# Patient Record
Sex: FEMALE | Race: WHITE | NOT HISPANIC OR LATINO | Employment: FULL TIME | URBAN - METROPOLITAN AREA
[De-identification: names, ages, dates, MRNs, and addresses within clinical notes are randomized per-mention and may not be internally consistent; named-entity substitution may affect disease eponyms.]

---

## 2017-04-06 ENCOUNTER — TRANSCRIBE ORDERS (OUTPATIENT)
Dept: ADMINISTRATIVE | Facility: HOSPITAL | Age: 47
End: 2017-04-06

## 2017-04-06 DIAGNOSIS — Z12.31 ENCOUNTER FOR SCREENING MAMMOGRAM FOR MALIGNANT NEOPLASM OF BREAST: ICD-10-CM

## 2017-04-06 DIAGNOSIS — Z12.31 ENCOUNTER FOR MAMMOGRAM TO ESTABLISH BASELINE MAMMOGRAM: Primary | ICD-10-CM

## 2017-04-12 ENCOUNTER — HOSPITAL ENCOUNTER (OUTPATIENT)
Dept: MAMMOGRAPHY | Facility: CLINIC | Age: 47
Discharge: HOME | End: 2017-04-12
Payer: COMMERCIAL

## 2017-04-12 DIAGNOSIS — Z12.31 ENCOUNTER FOR SCREENING MAMMOGRAM FOR MALIGNANT NEOPLASM OF BREAST: ICD-10-CM

## 2017-04-12 PROCEDURE — G0202 SCR MAMMO BI INCL CAD: HCPCS

## 2017-08-24 ENCOUNTER — GENERIC CONVERSION - ENCOUNTER (OUTPATIENT)
Dept: OTHER | Facility: OTHER | Age: 47
End: 2017-08-24

## 2017-11-01 ENCOUNTER — ALLSCRIPTS OFFICE VISIT (OUTPATIENT)
Dept: OTHER | Facility: OTHER | Age: 47
End: 2017-11-01

## 2017-11-01 ENCOUNTER — LAB REQUISITION (OUTPATIENT)
Dept: LAB | Facility: HOSPITAL | Age: 47
End: 2017-11-01
Payer: COMMERCIAL

## 2017-11-01 DIAGNOSIS — N92.6 IRREGULAR MENSTRUATION: ICD-10-CM

## 2017-11-01 DIAGNOSIS — Z01.419 ENCOUNTER FOR GYNECOLOGICAL EXAMINATION WITHOUT ABNORMAL FINDING: ICD-10-CM

## 2017-11-01 DIAGNOSIS — E78.5 HYPERLIPIDEMIA: ICD-10-CM

## 2017-11-01 DIAGNOSIS — R53.83 OTHER FATIGUE: ICD-10-CM

## 2017-11-01 PROCEDURE — 87624 HPV HI-RISK TYP POOLED RSLT: CPT | Performed by: FAMILY MEDICINE

## 2017-11-01 PROCEDURE — G0145 SCR C/V CYTO,THINLAYER,RESCR: HCPCS | Performed by: FAMILY MEDICINE

## 2017-11-06 LAB — HPV RRNA GENITAL QL NAA+PROBE: NORMAL

## 2017-11-08 LAB
LAB AP GYN PRIMARY INTERPRETATION: NORMAL
Lab: NORMAL

## 2018-01-16 ENCOUNTER — ALLSCRIPTS OFFICE VISIT (OUTPATIENT)
Dept: OTHER | Facility: OTHER | Age: 48
End: 2018-01-16

## 2018-01-16 DIAGNOSIS — K59.00 CONSTIPATION: ICD-10-CM

## 2018-01-16 DIAGNOSIS — R19.4 CHANGE IN BOWEL HABITS: ICD-10-CM

## 2018-01-17 NOTE — PROGRESS NOTES
Assessment   1  Never a smoker  2  Encounter for routine gynecological examination (V72 31) (Z01 419)  3  Menstrual periods irregular (626 4) (N92 6)  4  Change in bowel movement (787 99) (R19 4)    Plan  Allergic rhinitis    · Renew: Fluticasone Propionate 50 MCG/ACT Nasal Suspension; USE 2 SPRAYS IN  EACH NOSTRIL DAILY  Change in bowel movement    · 1 - Malia Wilson DO (Gastroenterology) Co-Management  *  Status: Active   Requested for: 75OGW8353  () Care Summary provided  : Yes  Encounter for routine gynecological examination    · Follow-up visit in 1 year Evaluation and Treatment  Follow-up  Status: Hold For -  Scheduling  Requested for: 12FSI0627   · (1) THIN PREP PAP WITH IMAGING; Status: In Progress - Specimen/Data Collected;    Done: 79AKZ3095  PAP Maturation index required? : No  Reflex HPV? : Regardless of Interpretation  Encounter for screening mammogram for malignant neoplasm of breast    · * MAMMO SCREENING BILATERAL W CAD; Status:Active; Requested for:13Apr2018; Fatigue, Menstrual periods irregular    · (1) CBC/PLT/DIFF; Status:Active; Requested for:01Nov2017;    · (1) COMPREHENSIVE METABOLIC PANEL; Status:Active; Requested for:01Nov2017;    · (1) TSH WITH FT4 REFLEX; Status:Active; Requested for:01Nov2017;   Hyperlipidemia    · (1) LIPID PANEL FASTING W DIRECT LDL REFLEX; Status:Active; Requested  for:01Nov2017;     Discussion/Summary  health maintenance visit Currently, she eats a poor diet and has an inadequate exercise regimen  Pap test was done today Breast cancer screening: mammogram is current and the next mammogram is due 04/2018  Colorectal cancer screening: the risks and benefits of colorectal cancer screening were discussed and GI referral  The immunizations are up to date  Advice and education were given regarding nutrition, aerobic exercise, weight bearing exercise, vitamin D supplements, sunscreen use and self skin examination  Patient discussion: discussed with the patient  The treatment plan was reviewed with the patient/guardian  The patient/guardian understands and agrees with the treatment plan      Chief Complaint  Pt presents in the office today for a pap;    Mammo due 04/12/2018  Pap due 09/23/2016      History of Present Illness  HM, Adult Female: The patient is being seen for a gynecology evaluation  The last health maintenance visit was 2 year(s) ago  General Health: She has regular dental visits  She denies vision problems  She denies hearing loss  Immunizations status: up to date  Lifestyle:  She does not have a healthy diet  She does not exercise regularly  She does not use tobacco  She consumes alcohol  She reports occasional alcohol use  She denies drug use  Reproductive health: the patient is premenopausal   she uses contraception  For contraception, she uses oral contraception pills  pregnancy history: G 2P 2   has been having spotting mid cycle for 1-2 days for the past 2 cycles  Screening: cancer screening reviewed and current  Review of Systems    Constitutional: No fever, no chills, feels well, no tiredness, no recent weight gain or weight loss  ENT: no complaints of earache, no loss of hearing, no nose bleeds, no nasal discharge, no sore throat, no hoarseness  Cardiovascular: the heart rate was not slow, no chest pain, no intermittent leg claudication, the heart rate was not fast, no palpitations and no lower extremity edema  Respiratory: no shortness of breath, no orthopnea, no wheezing, no shortness of breath during exertion and no PND  Gastrointestinal: loose stools alternating with constipation, but no abdominal pain, no nausea, no vomiting and no blood in stools  Genitourinary: as noted in HPI, no dysuria, no pelvic pain, no vaginal discharge and no dysmenorrhea  Musculoskeletal: No complaints of arthralgias, no myalgias, no joint swelling or stiffness, no limb pain or swelling  Integumentary: no breast pain and no breast lump  Neurological: no headache, no numbness, no tingling, no dizziness, no limb weakness, no fainting and no difficulty walking  Psychiatric: no anxiety, no sleep disturbances, no depression and no emotional problems  Hematologic/Lymphatic: no swollen glands  Active Problems   1  Allergic rhinitis (477 9) (J30 9)  2  Anxiety (300 00) (F41 9)  3  Change in bowel movement (787 99) (R19 4)  4  Contraceptives (V25 02)  5  Encounter for routine gynecological examination (V72 31) (Z01 419)  6  Encounter for screening mammogram for malignant neoplasm of breast (V76 12)   (Z12 31)  7  Fatigue (780 79) (R53 83)  8  Hyperlipidemia (272 4) (E78 5)  9  Menstrual periods irregular (626 4) (N92 6)  10  Need for prophylactic vaccination and inoculation against influenza (V04 81) (Z23)  11  Vitamin D deficiency (268 9) (E55 9)    Past Medical History    · Acute sinusitis (461 9) (J01 90)   · Bronchitis, acute (466 0) (J20 9)   · History of Cough (786 2) (R05)   · History of Encounter for routine gynecological examination (V72 31) (Z01 419)   · Need for prophylactic vaccination and inoculation against influenza (V04 81) (Z23)    Surgical History    · History of  Section   · Contraceptives (V25 02)    Family History  Father    · Family history of Coronary Artery Disease (V17 49)   · Denied: Family history of substance abuse1    · No family history of mental disorder1   Maternal Grandmother    · Family history of Diabetes Mellitus (V18 0)   · Family history of Maternal Grandmother Is   Paternal Grandmother    · Family history of Colon Cancer (V16 0)  Maternal Aunt    · Family history of Breast Cancer (V16 3)     1 Amended By: Sonam Clifford; 2017 11:12 PM EST    Social History    · Being A Social Drinker   · Caffeine Use   · 1-1 1/2 cup coffee per day   · Never a smoker   · No drug use    Current Meds  1  Cetirizine HCl - 10 MG Oral Tablet; TAKE 1 TABLET DAILY AS NEEDED; Last   Rx:36Rpi6848 Ordered  2  Fluticasone Propionate 50 MCG/ACT Nasal Suspension; USE 2 SPRAYS IN EACH   NOSTRIL DAILY; Therapy: 00SLE1569 to (Last Rx:90Lxv1664)  Requested for: 22Tow0421 Ordered  3  Low-Ogestrel 0 3-30 MG-MCG Oral Tablet; Take 1 tablet daily; Therapy: 91CVM8179 to (Last Rx:73Uey9393)  Requested for: 49BGP3115 Ordered  4  Proventil  (90 Base) MCG/ACT Inhalation Aerosol Solution; INHALE 2 PUFFS   EVERY 4-6 HOURS AS NEEDED; Therapy: 53RLT2713 to (Last Rx:10Oct2014) Ordered    Allergies   1  No Known Drug Allergies    Vitals   Recorded: 91EGI3785 01:44PM   Heart Rate 76   Respiration 16   Systolic 639   Diastolic 82   Height 5 ft 4 in   Weight 214 lb 6 4 oz   BMI Calculated 36 8   BSA Calculated 2 01     Physical Exam    Constitutional   General appearance: No acute distress, well appearing and well nourished  Ears, Nose, Mouth, and Throat   Otoscopic examination: Tympanic membranes translucent with normal light reflex  Canals patent without erythema  Lips, teeth, and gums: Normal, good dentition  Oropharynx: Normal with no erythema, edema, exudate or lesions  Neck   Neck: Supple, symmetric, trachea midline, no masses  Thyroid: Normal, no thyromegaly  Pulmonary   Respiratory effort: No increased work of breathing or signs of respiratory distress  Auscultation of lungs: Clear to auscultation  Cardiovascular   Auscultation of heart: Normal rate and rhythm, normal S1 and S2, no murmurs  Examination of extremities for edema and/or varicosities: Normal     Chest   Breasts: Normal, no dimpling or skin changes appreciated  Palpation of breasts and axillae: Normal, no masses palpated  Abdomen   Abdomen: Non-tender, no masses  Liver and spleen: No hepatomegaly or splenomegaly  Genitourinary   External genitalia and vagina: Normal, no lesions appreciated  Cervix: Normal, no lesions, Pap obtained  Uterus: Normal size, no tenderness, no masses      Adnexa/Parametria: Normal, no masses or tenderness  Lymphatic   Palpation of lymph nodes in neck: No lymphadenopathy  Palpation of lymph nodes in axillae: No lymphadenopathy  Palpation of lymph nodes in groin: No lymphadenopathy  Psychiatric   Orientation to person, place, and time: Normal     Recent and remote memory: Intact  Mood and affect: Normal        Results/Data  PHQ-2 Adult Depression Screening 30OBB2244 01:47PM User, Willy     Test Name Result Flag Reference   PHQ-2 Adult Depression Score 0     Over the last two weeks, how often have you been bothered by any of the following problems? Little interest or pleasure in doing things: Not at all - 0  Feeling down, depressed, or hopeless: Not at all - 0   PHQ-2 Adult Depression Screening Negative         Health Management  Encounter for routine gynecological examination   (every) THINPREP TIS PAP RFX HPV; every 2 years; Last 93IXS0491; Next Due:  24VLN6975; Overdue  Encounter for screening mammogram for malignant neoplasm of breast   * MAMMO SCREENING BILATERAL W CAD; every 1 year; Last 56Psg4874; Next Due:  21Rnc6148;  Active    Signatures   Electronically signed by : DINA Ge ; Nov 1 2017 11:12PM EST                       (Author)

## 2018-01-17 NOTE — CONSULTS
Assessment   1  Constipation (564 00) (K59 00)   2  Change in bowel movement (787 99) (R19 4)   3  Irritable bowel syndrome with both constipation and diarrhea (564 1) (K58 2)    Plan   Change in bowel movement    · MoviPrep 100 GM Oral Solution Reconstituted; USE AS DIRECTED   Rx By: Angella Richard; Dispense: 1 Days ; #:1 Solution Reconstituted; Refill: 0;For: Change in bowel movement; SHAYLA = N; Verified Transmission to [id number] Last Updated By: System, [employer]; 1/16/2018 10:06:04 AM   · (1) TISSUE TRANSGLUTAMINASE IGA; Status:Active; Requested VDB:92YIJ3338; Perform:[facility]; FZA:46BGC8558;OBWIACP; For:Change in bowel movement; Ordered By:Cassie Wilson;   · COLONOSCOPY (GI, SURG); Status:Hold For - Scheduling; Requested VIS:83SXC8651; Perform:[facility]; OIN:59YDC3463;TELOSVG; For:Change in bowel movement; Ordered By:Cassie Wilson; Change in bowel movement, Constipation    · (1) IGA; Status:Active; Requested MDH:22IWD2570; Perform:[facility]; ENRIKE:25YZD2432;PPWPBAV; For:Change in bowel movement, Constipation; Ordered By:Charlotte Wilson;  Irritable bowel syndrome with both constipation and diarrhea    · Follow-up visit in 4 Months Evaluation and Treatment  Follow-up  Status: Complete     Done: 98ZOZ0063   Ordered; For: Irritable bowel syndrome with both constipation and diarrhea; Ordered By: Angella Richard Performed:  Due: 90JUD1900; Last Updated By: Shayy Rodriguez; 1/16/2018 10:27:43 AM    Discussion/Summary   Discussion Summary:    41year old female with    family history of colon cancer- will do colonoscopy IBS- mixed predominant- will rule out celiac disease, labs to assess for anemia and TSH as well as CMP   up in a few months time      Chief Complaint   Chief Complaint Free Text Note Form: Consult for change in bowel habits  Pt c/o abdominal pain, constipation, and diarrhea  Referred by Dr Ng Situ        History of Present Illness   HPI: 41year old female referred by PCP  a maternal grandmother had colon cancer in her 52's  Denies any first degree relative with colon cancer  The pt has never had a colonoscopy  denies hematochezia   is an  and thinks she has IBS  She has alternating constipation and diarrhea  She doesn't really have abdominal pain  Weight is stable  Review of Systems   Complete-Female GI Adult:      Constitutional: No fever, no chills, feels well, no tiredness, no recent weight gain or weight loss  Eyes: No complaints of eye pain, no red eyes, no eyesight problems, no discharge, no dry eyes, no itching of eyes  ENT: no complaints of earache, no loss of hearing, no nose bleeds, no nasal discharge, no sore throat, no hoarseness  Cardiovascular: No complaints of slow heart rate, no fast heart rate, no chest pain, no palpitations, no leg claudication, no lower extremity edema  Respiratory: No complaints of shortness of breath, no wheezing, no cough, no SOB on exertion, no orthopnea, no PND  Gastrointestinal: as noted in HPI  Genitourinary: No complaints of dysuria, no incontinence, no pelvic pain, no dysmenorrhea, no vaginal discharge or bleeding  Musculoskeletal: No complaints of arthralgias, no myalgias, no joint swelling or stiffness, no limb pain or swelling  Integumentary: No complaints of skin rash or lesions, no itching, no skin wounds, no breast pain or lump  Neurological: No complaints of headache, no confusion, no convulsions, no numbness, no dizziness or fainting, no tingling, no limb weakness, no difficulty walking  Psychiatric: Not suicidal, no sleep disturbance, no anxiety or depression, no change in personality, no emotional problems  Endocrine: No complaints of proptosis, no hot flashes, no muscle weakness, no deepening of the voice, no feelings of weakness        Hematologic/Lymphatic: No complaints of swollen glands, no swollen glands in the neck, does not bleed easily, does not bruise easily  ROS Reviewed:    ROS reviewed  Active Problems   1  Allergic rhinitis (477 9) (J30 9)   2  Anxiety (300 00) (F41 9)   3  Change in bowel movement (787 99) (R19 4)   4  Contraceptives (V25 02)   5  Encounter for routine gynecological examination (V72 31) (Z01 419)   6  Encounter for screening mammogram for malignant neoplasm of breast (V76 12)     (Z12 31)   7  Fatigue (780 79) (R53 83)   8  Hyperlipidemia (272 4) (E78 5)   9  Menstrual periods irregular (626 4) (N92 6)   10  Need for prophylactic vaccination and inoculation against influenza (V04 81) (Z23)   11  Vitamin D deficiency (268 9) (E55 9)    Past Medical History   1  Acute sinusitis (461 9) (J01 90)   2  Bronchitis, acute (466 0) (J20 9)   3  History of Cough (786 2) (R05)   4  History of Encounter for routine gynecological examination (V72 31) (Z01 419)   5  Need for prophylactic vaccination and inoculation against influenza (V04 81) (Z23)  Active Problems And Past Medical History Reviewed: The active problems and past medical history were reviewed and updated today  Surgical History   1  History of  Section   2  Contraceptives (V25 02)  Surgical History Reviewed: The surgical history was reviewed and updated today  Family History   Mother    1  Denied: Family history of substance abuse   2  No family history of mental disorder  Father    3  Family history of Coronary Artery Disease (V17 49)   4  Denied: Family history of substance abuse   5  No family history of mental disorder  Maternal Grandmother    6  Family history of Diabetes Mellitus (V18 0)   7  Family history of Maternal Grandmother Is   Paternal Grandmother    6  Family history of Colon Cancer (V16 0)  Maternal Aunt    9  Family history of Breast Cancer (V16 3)  Family History Reviewed: The family history was reviewed and updated today         Social History    · Being A Social Drinker   · Caffeine Use   · Never a smoker   · No drug use  Social History Reviewed: The social history was reviewed and updated today  Current Meds    1  Cetirizine HCl - 10 MG Oral Tablet; TAKE 1 TABLET DAILY AS NEEDED; Last     Rx:09Lhv4772 Ordered   2  Fluticasone Propionate 50 MCG/ACT Nasal Suspension; USE 2 SPRAYS IN EACH     NOSTRIL DAILY; Therapy: 13CFH9249 to (Last Rx:01Nov2017)  Requested for: 07OAJ1306 Ordered   3  Low-Ogestrel 0 3-30 MG-MCG Oral Tablet; Take 1 tablet daily; Therapy: 02HRQ0648 to (Last Rx:60Aqq8566)  Requested for: 93OWZ6497 Ordered   4  Proventil  (90 Base) MCG/ACT Inhalation Aerosol Solution; INHALE 2 PUFFS     EVERY 4-6 HOURS AS NEEDED; Therapy: 06IIY2699 to (Last Rx:10Oct2014) Ordered  Medication List Reviewed: The medication list was reviewed and updated today  Allergies   1  No Known Drug Allergies  2  No Known Food Allergies   3  Seasonal    Vitals   Vital Signs    Recorded: 44CWC7768 09:54AM   Temperature 97 8 F, Tympanic   Heart Rate 73   Systolic 693, LUE, Sitting   Diastolic 84, LUE, Sitting   Height 5 ft 4 in   Weight 217 lb    BMI Calculated 37 25   BSA Calculated 2 03   O2 Saturation 98, RA     Physical Exam        Constitutional      General appearance: No acute distress, well appearing and well nourished  Eyes      Conjunctiva and lids: No swelling, erythema or discharge  Pupils and irises: Equal, round and reactive to light  Ears, Nose, Mouth, and Throat      External inspection of ears and nose: Normal        Nasal mucosa, septum, and turbinates: Normal without edema or erythema  Oropharynx: Normal with no erythema, edema, exudate or lesions  Pulmonary      Respiratory effort: No increased work of breathing or signs of respiratory distress  Auscultation of lungs: Clear to auscultation  Cardiovascular      Auscultation of heart: Normal rate and rhythm, normal S1 and S2, without murmurs         Examination of extremities for edema and/or varicosities: Normal        Abdomen      Abdomen: Non-tender, no masses  Liver and spleen: No hepatomegaly or splenomegaly  Lymphatic      Palpation of lymph nodes in neck: No lymphadenopathy  Musculoskeletal      Gait and station: Normal        Digits and nails: Normal without clubbing or cyanosis  Inspection/palpation of joints, bones, and muscles: Normal        Skin      Skin and subcutaneous tissue: Normal without rashes or lesions         Psychiatric      Orientation to person, place, and time: Normal        Mood and affect: Normal           Signatures    Electronically signed by : Maria Esther Mcbride DO; Jan 16 2018 10:38AM EST                       (Author)

## 2018-01-22 VITALS
HEART RATE: 76 BPM | DIASTOLIC BLOOD PRESSURE: 82 MMHG | WEIGHT: 214.4 LBS | HEIGHT: 64 IN | RESPIRATION RATE: 16 BRPM | BODY MASS INDEX: 36.6 KG/M2 | SYSTOLIC BLOOD PRESSURE: 138 MMHG

## 2018-01-22 VITALS — BODY MASS INDEX: 32.61 KG/M2 | WEIGHT: 191 LBS | HEIGHT: 64 IN

## 2018-01-23 VITALS
HEART RATE: 73 BPM | DIASTOLIC BLOOD PRESSURE: 84 MMHG | HEIGHT: 64 IN | SYSTOLIC BLOOD PRESSURE: 122 MMHG | OXYGEN SATURATION: 98 % | WEIGHT: 217 LBS | TEMPERATURE: 97.8 F | BODY MASS INDEX: 37.05 KG/M2

## 2018-01-29 PROBLEM — R19.8 CHANGE IN BOWEL MOVEMENT: Status: ACTIVE | Noted: 2018-01-29

## 2018-02-16 ENCOUNTER — APPOINTMENT (OUTPATIENT)
Dept: LAB | Facility: CLINIC | Age: 48
End: 2018-02-16
Payer: COMMERCIAL

## 2018-02-16 DIAGNOSIS — K59.00 CONSTIPATION: ICD-10-CM

## 2018-02-16 DIAGNOSIS — E78.5 HYPERLIPIDEMIA: ICD-10-CM

## 2018-02-16 DIAGNOSIS — N92.6 IRREGULAR MENSTRUATION: ICD-10-CM

## 2018-02-16 DIAGNOSIS — R19.4 CHANGE IN BOWEL HABITS: ICD-10-CM

## 2018-02-16 DIAGNOSIS — R53.83 OTHER FATIGUE: ICD-10-CM

## 2018-02-16 LAB
ALBUMIN SERPL BCP-MCNC: 3.6 G/DL (ref 3.5–5)
ALP SERPL-CCNC: 45 U/L (ref 46–116)
ALT SERPL W P-5'-P-CCNC: 15 U/L (ref 12–78)
ANION GAP SERPL CALCULATED.3IONS-SCNC: 7 MMOL/L (ref 4–13)
AST SERPL W P-5'-P-CCNC: 10 U/L (ref 5–45)
BASOPHILS # BLD AUTO: 0.02 THOUSANDS/ΜL (ref 0–0.1)
BASOPHILS NFR BLD AUTO: 0 % (ref 0–1)
BILIRUB SERPL-MCNC: 0.43 MG/DL (ref 0.2–1)
BUN SERPL-MCNC: 14 MG/DL (ref 5–25)
CALCIUM SERPL-MCNC: 8.6 MG/DL (ref 8.3–10.1)
CHLORIDE SERPL-SCNC: 104 MMOL/L (ref 100–108)
CHOLEST SERPL-MCNC: 207 MG/DL (ref 50–200)
CO2 SERPL-SCNC: 28 MMOL/L (ref 21–32)
CREAT SERPL-MCNC: 0.56 MG/DL (ref 0.6–1.3)
EOSINOPHIL # BLD AUTO: 0.17 THOUSAND/ΜL (ref 0–0.61)
EOSINOPHIL NFR BLD AUTO: 2 % (ref 0–6)
ERYTHROCYTE [DISTWIDTH] IN BLOOD BY AUTOMATED COUNT: 13.3 % (ref 11.6–15.1)
GFR SERPL CREATININE-BSD FRML MDRD: 113 ML/MIN/1.73SQ M
GLUCOSE P FAST SERPL-MCNC: 82 MG/DL (ref 65–99)
HCT VFR BLD AUTO: 39.2 % (ref 34.8–46.1)
HDLC SERPL-MCNC: 71 MG/DL (ref 40–60)
HGB BLD-MCNC: 13.3 G/DL (ref 11.5–15.4)
IGA SERPL-MCNC: 156 MG/DL (ref 70–400)
LDLC SERPL CALC-MCNC: 105 MG/DL (ref 0–100)
LYMPHOCYTES # BLD AUTO: 2.59 THOUSANDS/ΜL (ref 0.6–4.47)
LYMPHOCYTES NFR BLD AUTO: 37 % (ref 14–44)
MCH RBC QN AUTO: 29 PG (ref 26.8–34.3)
MCHC RBC AUTO-ENTMCNC: 33.9 G/DL (ref 31.4–37.4)
MCV RBC AUTO: 86 FL (ref 82–98)
MONOCYTES # BLD AUTO: 0.42 THOUSAND/ΜL (ref 0.17–1.22)
MONOCYTES NFR BLD AUTO: 6 % (ref 4–12)
NEUTROPHILS # BLD AUTO: 3.88 THOUSANDS/ΜL (ref 1.85–7.62)
NEUTS SEG NFR BLD AUTO: 55 % (ref 43–75)
NRBC BLD AUTO-RTO: 0 /100 WBCS
PLATELET # BLD AUTO: 244 THOUSANDS/UL (ref 149–390)
PMV BLD AUTO: 11.4 FL (ref 8.9–12.7)
POTASSIUM SERPL-SCNC: 4.1 MMOL/L (ref 3.5–5.3)
PROT SERPL-MCNC: 7.3 G/DL (ref 6.4–8.2)
RBC # BLD AUTO: 4.58 MILLION/UL (ref 3.81–5.12)
SODIUM SERPL-SCNC: 139 MMOL/L (ref 136–145)
TRIGL SERPL-MCNC: 156 MG/DL
TSH SERPL DL<=0.05 MIU/L-ACNC: 1.48 UIU/ML (ref 0.36–3.74)
WBC # BLD AUTO: 7.1 THOUSAND/UL (ref 4.31–10.16)

## 2018-02-16 PROCEDURE — 80053 COMPREHEN METABOLIC PANEL: CPT

## 2018-02-16 PROCEDURE — 85025 COMPLETE CBC W/AUTO DIFF WBC: CPT

## 2018-02-16 PROCEDURE — 80061 LIPID PANEL: CPT

## 2018-02-16 PROCEDURE — 82784 ASSAY IGA/IGD/IGG/IGM EACH: CPT

## 2018-02-16 PROCEDURE — 84443 ASSAY THYROID STIM HORMONE: CPT

## 2018-02-16 PROCEDURE — 36415 COLL VENOUS BLD VENIPUNCTURE: CPT

## 2018-02-16 PROCEDURE — 83516 IMMUNOASSAY NONANTIBODY: CPT

## 2018-02-17 LAB — TTG IGA SER-ACNC: <2 U/ML (ref 0–3)

## 2018-02-19 ENCOUNTER — ANESTHESIA (OUTPATIENT)
Dept: GASTROENTEROLOGY | Facility: MEDICAL CENTER | Age: 48
End: 2018-02-19
Payer: COMMERCIAL

## 2018-02-19 ENCOUNTER — ANESTHESIA EVENT (OUTPATIENT)
Dept: GASTROENTEROLOGY | Facility: MEDICAL CENTER | Age: 48
End: 2018-02-19
Payer: COMMERCIAL

## 2018-02-19 ENCOUNTER — HOSPITAL ENCOUNTER (OUTPATIENT)
Facility: MEDICAL CENTER | Age: 48
Setting detail: OUTPATIENT SURGERY
Discharge: HOME | End: 2018-02-19
Attending: INTERNAL MEDICINE | Admitting: INTERNAL MEDICINE
Payer: COMMERCIAL

## 2018-02-19 VITALS
SYSTOLIC BLOOD PRESSURE: 150 MMHG | TEMPERATURE: 98.4 F | WEIGHT: 210 LBS | OXYGEN SATURATION: 98 % | DIASTOLIC BLOOD PRESSURE: 81 MMHG | RESPIRATION RATE: 18 BRPM | BODY MASS INDEX: 35.85 KG/M2 | HEIGHT: 64 IN | HEART RATE: 79 BPM

## 2018-02-19 DIAGNOSIS — R19.8 CHANGE IN BOWEL MOVEMENT: ICD-10-CM

## 2018-02-19 LAB — EXT PREGNANCY TEST URINE: NEGATIVE

## 2018-02-19 PROCEDURE — 45380 COLONOSCOPY AND BIOPSY: CPT | Performed by: INTERNAL MEDICINE

## 2018-02-19 PROCEDURE — 81025 URINE PREGNANCY TEST: CPT | Performed by: ANESTHESIOLOGY

## 2018-02-19 PROCEDURE — 88305 TISSUE EXAM BY PATHOLOGIST: CPT | Performed by: INTERNAL MEDICINE

## 2018-02-19 PROCEDURE — 88305 TISSUE EXAM BY PATHOLOGIST: CPT | Performed by: PATHOLOGY

## 2018-02-19 RX ORDER — SODIUM CHLORIDE 9 MG/ML
125 INJECTION, SOLUTION INTRAVENOUS CONTINUOUS
Status: DISCONTINUED | OUTPATIENT
Start: 2018-02-19 | End: 2018-02-19 | Stop reason: HOSPADM

## 2018-02-19 RX ORDER — CETIRIZINE HYDROCHLORIDE 10 MG/1
10 TABLET ORAL DAILY
COMMUNITY

## 2018-02-19 RX ORDER — ALBUTEROL SULFATE 90 UG/1
2 AEROSOL, METERED RESPIRATORY (INHALATION) EVERY 6 HOURS PRN
COMMUNITY
End: 2021-04-02 | Stop reason: ALTCHOICE

## 2018-02-19 RX ORDER — PROPOFOL 10 MG/ML
INJECTION, EMULSION INTRAVENOUS AS NEEDED
Status: DISCONTINUED | OUTPATIENT
Start: 2018-02-19 | End: 2018-02-19 | Stop reason: SURG

## 2018-02-19 RX ADMIN — PROPOFOL 50 MG: 10 INJECTION, EMULSION INTRAVENOUS at 13:14

## 2018-02-19 RX ADMIN — PROPOFOL 50 MG: 10 INJECTION, EMULSION INTRAVENOUS at 13:12

## 2018-02-19 RX ADMIN — SODIUM CHLORIDE 125 ML/HR: 0.9 INJECTION, SOLUTION INTRAVENOUS at 13:03

## 2018-02-19 RX ADMIN — PROPOFOL 50 MG: 10 INJECTION, EMULSION INTRAVENOUS at 13:20

## 2018-02-19 RX ADMIN — PROPOFOL 50 MG: 10 INJECTION, EMULSION INTRAVENOUS at 13:17

## 2018-02-19 RX ADMIN — PROPOFOL 50 MG: 10 INJECTION, EMULSION INTRAVENOUS at 13:13

## 2018-02-19 RX ADMIN — PROPOFOL 50 MG: 10 INJECTION, EMULSION INTRAVENOUS at 13:25

## 2018-02-19 NOTE — OP NOTE
**** GI/ENDOSCOPY REPORT ****     PATIENT NAME: Kelly Chang ------ VISIT ID:  Patient ID:   [id number] YOB: 1970     INTRODUCTION: Colonoscopy - A 41 female patient presents for an outpatient   Colonoscopy at [address] [place] [address]  PREVIOUS COLONOSCOPY: No prior colonoscopy  INDICATIONS: Abdominal pain  Change in bowel habits  CONSENT:  The benefits, risks, and alternatives to the procedure were   discussed and informed consent was obtained from the patient  PREPARATION: EKG, pulse, pulse oximetry and blood pressure were monitored   throughout the procedure  The patient was identified by myself both   verbally and by visual inspection of ID band  Airway Assessment   Classification: Airway class 2 - Visualization of the soft palate, fauces   and uvula  ASA Classification: See anesthesia record  MEDICATIONS: Anesthesia-check records     PROCEDURE:  The endoscope was passed without difficulty through the anus   under direct visualization and advanced to the cecum, confirmed by   appendiceal orifice and ileocecal valve  The scope was withdrawn and the   mucosa was carefully examined  The quality of the preparation was good  Cecal Intubation Time: 6 minutes(s) Scope Withdrawal Time: 11 minutes(s)     RECTAL EXAM: Normal rectal exam      FINDINGS:  The colon appeared to be normal except for small internal   hemorrhoids  A cold forceps biopsy was taken  COMPLICATIONS: There were no complications  IMPRESSIONS: Normal colon  Biopsy taken  RECOMMENDATIONS: Await biopsy results  ESTIMATED BLOOD LOSS:     PATHOLOGY SPECIMENS: Cold forceps random biopsy taken       PROCEDURE CODES:     ICD-9 Codes: 789 00 Abdominal pain, unspecified site 787 99 Other symptoms   involving digestive system     ICD-10 Codes: R10 Abdominal and pelvic pain R19 4 Change in bowel habit     PERFORMED BY: JANETTE Taylor  on 02/19/2018  Version 1, electronically signed by JANETTE Meeks  on   02/19/2018 at 13:38

## 2018-02-19 NOTE — H&P
History and Physical - [facility]  Jamal Cortés 41 y o  female MRN: [id number]                  HPI: Jamal Cortés is a 41y o  year old female who presents for abdominal pain  REVIEW OF SYSTEMS: Per the HPI, and otherwise unremarkable  Historical Information   History reviewed  No pertinent past medical history  Past Surgical History:   Procedure Laterality Date     SECTION      x 2     Social History   History   Alcohol Use    Yes     Comment: occassionally     History   Drug Use No     History   Smoking Status    Never Smoker   Smokeless Tobacco    Never Used     History reviewed  No pertinent family history  Meds/Allergies     Prescriptions Prior to Admission   Medication    cetirizine (ZYRTEC ALLERGY) 10 mg tablet    norgestrel-ethinyl estradiol (LO/OVRAL) 0 3 mg-30 mcg per tablet    albuterol (PROVENTIL HFA,VENTOLIN HFA) 90 mcg/act inhaler       No Known Allergies    Objective     Blood pressure 142/83, pulse 84, temperature 98 4 °F (36 9 °C), temperature source Temporal, resp  rate 16, height 5' 4" (1 626 m), weight 95 3 kg (210 lb), last menstrual period 2018, SpO2 98 %  PHYSICAL EXAM    Gen: NAD  CV: RRR  CHEST: Clear  ABD: soft, NT/ND  EXT: no edema      ASSESSMENT/PLAN:  This is a 41y o  year old female here for abdominal pain      PLAN: colonoscopy

## 2018-02-19 NOTE — ANESTHESIA PREPROCEDURE EVALUATION
Review of Systems/Medical History  Patient summary reviewed        Cardiovascular  Negative cardio ROS    Pulmonary  Negative pulmonary ROS        GI/Hepatic  Negative GI/hepatic ROS          Negative  ROS        Endo/Other  Negative endo/other ROS   Obesity    GYN  Negative gynecology ROS          Hematology  Negative hematology ROS      Musculoskeletal  Negative musculoskeletal ROS        Neurology  Negative neurology ROS      Psychology   Negative psychology ROS              Physical Exam    Airway    Mallampati score: II  TM Distance: >3 FB  Neck ROM: full     Dental   No notable dental hx     Cardiovascular  Comment: Negative ROS, Rhythm: regular, Rate: normal, Cardiovascular exam normal    Pulmonary  Pulmonary exam normal Breath sounds clear to auscultation,     Other Findings        Anesthesia Plan  ASA Score- 2     Anesthesia Type- IV sedation with anesthesia with ASA Monitors  Additional Monitors:   Airway Plan:         Plan Factors-    Induction- intravenous  Postoperative Plan-     Informed Consent- Anesthetic plan and risks discussed with patient

## 2018-02-19 NOTE — DISCHARGE INSTRUCTIONS
Colonoscopy   WHAT YOU NEED TO KNOW:   A colonoscopy is a procedure to examine the inside of your colon (intestine) with a scope  Polyps or tissue growths may have been removed during your colonoscopy  It is normal to feel bloated and to have some abdominal discomfort  You should be passing gas  If you have hemorrhoids or you had polyps removed, you may have a small amount of bleeding  DISCHARGE INSTRUCTIONS:   Seek care immediately if:   · You have a large amount of bright red blood in your bowel movements  · Your abdomen is hard and firm and you have severe pain  · You have sudden trouble breathing  Contact your healthcare provider if:   · You develop a rash or hives  · You have a fever within 24 hours of your procedure  · You have not had a bowel movement for 3 days after your procedure  · You have questions or concerns about your condition or care  Activity:   · Do not lift, strain, or run  for 3 days after your procedure  · Rest after your procedure  You have been given medicine to relax you  Do not  drive or make important decisions until the day after your procedure  Return to your normal activity as directed  · Relieve gas and discomfort from bloating  by lying on your right side with a heating pad on your abdomen  You may need to take short walks to help the gas move out  Eat small meals until bloating is relieved  If you had polyps removed: For 7 days after your procedure:  · Do not  take aspirin  · Do not  go on long car rides  Help prevent constipation:   · Eat a variety of healthy foods  Healthy foods include fruit, vegetables, whole-grain breads, low-fat dairy products, beans, lean meat, and fish  Ask if you need to be on a special diet  Your healthcare provider may recommend that you eat high-fiber foods such as cooked beans  Fiber helps you have regular bowel movements  · Drink liquids as directed    Adults should drink between 9 and 13 eight-ounce cups of liquid every day  Ask what amount is best for you  For most people, good liquids to drink are water, juice, and milk  · Exercise as directed  Talk to your healthcare provider about the best exercise plan for you  Exercise can help prevent constipation, decrease your blood pressure and improve your health  Follow up with your healthcare provider as directed:  Write down your questions so you remember to ask them during your visits  © 2017 [employer] Information is for End User's use only and may not be sold, redistributed or otherwise used for commercial purposes  All illustrations and images included in CareNotes® are the copyrighted property of [employer]  or Shane Urias  The above information is an  only  It is not intended as medical advice for individual conditions or treatments  Talk to your doctor, nurse or pharmacist before following any medical regimen to see if it is safe and effective for you

## 2018-04-13 DIAGNOSIS — Z12.31 ENCOUNTER FOR SCREENING MAMMOGRAM FOR MALIGNANT NEOPLASM OF BREAST: ICD-10-CM

## 2018-04-24 ENCOUNTER — TELEPHONE (OUTPATIENT)
Dept: GASTROENTEROLOGY | Facility: CLINIC | Age: 48
End: 2018-04-24

## 2018-04-24 NOTE — TELEPHONE ENCOUNTER
DR RUST'S PT      Pt called requesting the code change from her procedure done 02/19/18  Pt was xfer to the [facility] billing but they advised her to call the office for this change   [phone]

## 2018-06-06 NOTE — TELEPHONE ENCOUNTER
Spoke to patient about billing issue   informed her she was referred to GI as a diagnostic patient and during her visit she also had other issues that were discussed  Patient would like to see if by adding the screening code, would it help with the cost  I stated I would reach out to billing/coding but I could not guarantee it would make a difference  Reached out to  Calvin Khoury, and he stated she was not referred as a screening colon ( due to family hx) and it would not change the cost by adding it due to her other GI issues  Called and left patient a voicemail for her to call me back  Left my direct extension

## 2018-06-19 DIAGNOSIS — Z30.41 ORAL CONTRACEPTIVE USE: Primary | ICD-10-CM

## 2018-06-19 RX ORDER — NORGESTREL AND ETHINYL ESTRADIOL 0.3-0.03MG
KIT ORAL
Qty: 84 TABLET | Refills: 3 | Status: SHIPPED | OUTPATIENT
Start: 2018-06-19 | End: 2019-05-22 | Stop reason: SDUPTHER

## 2018-07-20 ENCOUNTER — TELEPHONE (OUTPATIENT)
Dept: GASTROENTEROLOGY | Facility: MEDICAL CENTER | Age: 48
End: 2018-07-20

## 2018-07-20 NOTE — TELEPHONE ENCOUNTER
Pt called billing multiple times regarding her procedure, pt is asking that a preventative code be added to the order  I looked into pt note and it does say family history of colon cancer

## 2018-07-20 NOTE — TELEPHONE ENCOUNTER
How would I put in colon cancer screening as an indication now with the new guidelines on a  Colonoscopy that has already been done? Please reach out to pt as I'm not sure if this is what she is asking for? If there is a way to do this, I can given that she is 47 so I could put it in as colon cancer screening

## 2018-08-14 NOTE — TELEPHONE ENCOUNTER
Spoke to patient and informed her since I could not resolve I would forward to my manager  Pt agreed and verbalized understanding

## 2018-10-12 ENCOUNTER — TELEPHONE (OUTPATIENT)
Dept: GASTROENTEROLOGY | Facility: CLINIC | Age: 48
End: 2018-10-12

## 2018-10-12 NOTE — TELEPHONE ENCOUNTER
Called patient to inform her that we would be adjusting off any balance from our end  Patient wanted to assure this also included anesthesiology  Explained to patient I would check to make sure and call her back  Patient agreeable

## 2018-11-21 ENCOUNTER — OFFICE VISIT (OUTPATIENT)
Dept: FAMILY MEDICINE CLINIC | Facility: CLINIC | Age: 48
End: 2018-11-21
Payer: COMMERCIAL

## 2018-11-21 VITALS
HEART RATE: 80 BPM | RESPIRATION RATE: 16 BRPM | HEIGHT: 64 IN | TEMPERATURE: 97.9 F | WEIGHT: 216.3 LBS | SYSTOLIC BLOOD PRESSURE: 120 MMHG | BODY MASS INDEX: 36.93 KG/M2 | DIASTOLIC BLOOD PRESSURE: 78 MMHG

## 2018-11-21 DIAGNOSIS — J06.9 UPPER RESPIRATORY TRACT INFECTION, UNSPECIFIED TYPE: Primary | ICD-10-CM

## 2018-11-21 DIAGNOSIS — R49.0 HOARSENESS: ICD-10-CM

## 2018-11-21 PROCEDURE — 3008F BODY MASS INDEX DOCD: CPT | Performed by: FAMILY MEDICINE

## 2018-11-21 PROCEDURE — 1036F TOBACCO NON-USER: CPT | Performed by: FAMILY MEDICINE

## 2018-11-21 PROCEDURE — 99213 OFFICE O/P EST LOW 20 MIN: CPT | Performed by: FAMILY MEDICINE

## 2018-11-21 RX ORDER — FLUTICASONE PROPIONATE 50 MCG
2 SPRAY, SUSPENSION (ML) NASAL DAILY
COMMUNITY
Start: 2013-03-20 | End: 2020-04-06 | Stop reason: SDUPTHER

## 2018-11-21 RX ORDER — PREDNISONE 20 MG/1
20 TABLET ORAL 2 TIMES DAILY WITH MEALS
Qty: 10 TABLET | Refills: 0 | Status: SHIPPED | OUTPATIENT
Start: 2018-11-21 | End: 2019-12-20 | Stop reason: ALTCHOICE

## 2018-11-21 NOTE — PROGRESS NOTES
Assessment/Plan:  1  Upper respiratory tract infection/ Hoarseness  - discussed plenty of fluids and voice rest, start Prednisone 20 mg bid x 5 days  - predniSONE 20 mg tablet; Take 1 tablet (20 mg total) by mouth 2 (two) times a day with meals  Dispense: 10 tablet; Refill: 0  - follow up in the office if symptoms persist or worsen  Possible side effects of new medications were reviewed with the patient today  The treatment plan was reviewed with the patient  The patient understands and agrees with the treatment plan      Subjective:   Chief Complaint   Patient presents with   Li Louis Like Symptoms      Patient ID: Connie Ac is a 57 y o  female who presents with c/o nasal congestion and cough for 4-5 days, she is now losing her voice and having tightness in her chest  No fever or chills, no sore throat, no trouble swallowing, no heartburn, no purulent mucus production, no pleuritic chest pain, no wheezing  Patient has been taking OTC cold medications  The following portions of the patient's history were reviewed and updated as appropriate: allergies, current medications, past family history, past medical history, past social history, past surgical history and problem list     History reviewed  No pertinent past medical history  Past Surgical History:   Procedure Laterality Date     SECTION      x 2    COLONOSCOPY N/A 2018    Procedure: COLONOSCOPY;  Surgeon: Elif Covarrubias DO;  Location: [facility]; Service: Gastroenterology     Family History   Problem Relation Age of Onset    No Known Problems Mother     Hypertension Father     COPD Father     Cancer Brother     Substance Abuse Neg Hx     Mental illness Neg Hx      Social History     Social History    Marital status: /Civil Union     Spouse name: N/A    Number of children: N/A    Years of education: N/A     Occupational History    Not on file       Social History Main Topics    Smoking status: Never Smoker  Smokeless tobacco: Never Used    Alcohol use Yes      Comment: occassionally    Drug use: No    Sexual activity: Not on file     Other Topics Concern    Not on file     Social History Narrative    No narrative on file       Current Outpatient Prescriptions:     albuterol (PROVENTIL HFA,VENTOLIN HFA) 90 mcg/act inhaler, Inhale 2 puffs every 6 (six) hours as needed for wheezing, Disp: , Rfl:     cetirizine (ZYRTEC ALLERGY) 10 mg tablet, Take 10 mg by mouth daily, Disp: , Rfl:     fluticasone (FLONASE) 50 mcg/act nasal spray, 2 sprays into each nostril daily, Disp: , Rfl:     LOW-OGESTREL 0 3-30 MG-MCG per tablet, TAKE 1 TABLET DAILY, Disp: 84 tablet, Rfl: 3    predniSONE 20 mg tablet, Take 1 tablet (20 mg total) by mouth 2 (two) times a day with meals, Disp: 10 tablet, Rfl: 0    Review of Systems   Constitutional: Negative for chills, fatigue and fever  HENT: Positive for congestion and voice change  Negative for ear pain, postnasal drip, rhinorrhea, sinus pain, sore throat and trouble swallowing  Respiratory: Positive for cough and chest tightness  Negative for wheezing  Cardiovascular: Negative for chest pain  Gastrointestinal: Negative for abdominal pain, diarrhea, nausea and vomiting  Neurological: Negative for dizziness and headaches  Hematological: Negative for adenopathy  Objective:    Vitals:    11/21/18 1337   BP: 120/78   BP Location: Left arm   Patient Position: Sitting   Cuff Size: Adult   Pulse: 80   Resp: 16   Temp: 97 9 °F (36 6 °C)   Weight: 98 1 kg (216 lb 4 8 oz)   Height: 5' 4" (1 626 m)        Physical Exam   Constitutional: She is oriented to person, place, and time  She appears well-developed and well-nourished  No distress  HENT:   Head: Normocephalic and atraumatic  Right Ear: Tympanic membrane and ear canal normal    Left Ear: Tympanic membrane and ear canal normal    Nose: Mucosal edema present  No rhinorrhea   Right sinus exhibits no maxillary sinus tenderness and no frontal sinus tenderness  Left sinus exhibits no maxillary sinus tenderness and no frontal sinus tenderness  Mouth/Throat: Oropharynx is clear and moist  No oropharyngeal exudate or posterior oropharyngeal erythema  Neck: Neck supple  Cardiovascular: Normal rate, regular rhythm and normal heart sounds  No murmur heard  Pulmonary/Chest: Effort normal and breath sounds normal  No respiratory distress  She has no wheezes  She has no rhonchi  She has no rales  Musculoskeletal: She exhibits no edema  Lymphadenopathy:     She has no cervical adenopathy  Neurological: She is alert and oriented to person, place, and time  BMI Counseling: Body mass index is 37 13 kg/m²  The BMI is above normal  Nutrition recommendations include consuming healthier snacks, moderation in carbohydrate intake and reducing intake of saturated fat and trans fat  Exercise recommendations include exercising 3-5 times per week

## 2019-01-22 ENCOUNTER — TELEPHONE (OUTPATIENT)
Dept: GASTROENTEROLOGY | Facility: CLINIC | Age: 49
End: 2019-01-22

## 2019-01-22 NOTE — TELEPHONE ENCOUNTER
Talked to [address] [place] [address] regarding bill for services 2/19/18  [facility] will look into the charges as the diagnosis was for abd pain, change in bowel habits and hemorroids  As agreed with [facility] and patient, anesthesia is required dispite the billing code, [facility] would pay differently depending on the code, but in this instance the code is correct  The PA will reach out to patient regarding the decision to write off or pt must pay  Patient understand

## 2019-05-22 DIAGNOSIS — Z30.41 ORAL CONTRACEPTIVE USE: ICD-10-CM

## 2019-05-22 RX ORDER — NORGESTREL AND ETHINYL ESTRADIOL 0.3-0.03MG
KIT ORAL
Qty: 84 TABLET | Refills: 1 | Status: SHIPPED | OUTPATIENT
Start: 2019-05-22 | End: 2019-11-06 | Stop reason: SDUPTHER

## 2019-11-05 ENCOUNTER — IMMUNIZATIONS (OUTPATIENT)
Dept: FAMILY MEDICINE CLINIC | Facility: CLINIC | Age: 49
End: 2019-11-05
Payer: COMMERCIAL

## 2019-11-05 DIAGNOSIS — Z23 ENCOUNTER FOR IMMUNIZATION: ICD-10-CM

## 2019-11-05 PROCEDURE — 90686 IIV4 VACC NO PRSV 0.5 ML IM: CPT

## 2019-11-05 PROCEDURE — 90471 IMMUNIZATION ADMIN: CPT

## 2019-11-06 DIAGNOSIS — Z30.41 ORAL CONTRACEPTIVE USE: ICD-10-CM

## 2019-11-06 RX ORDER — NORGESTREL AND ETHINYL ESTRADIOL 0.3-0.03MG
KIT ORAL
Qty: 84 TABLET | Refills: 1 | Status: SHIPPED | OUTPATIENT
Start: 2019-11-06 | End: 2019-12-27 | Stop reason: SDUPTHER

## 2019-12-20 ENCOUNTER — TRANSCRIBE ORDERS (OUTPATIENT)
Dept: MAMMOGRAPHY | Facility: CLINIC | Age: 49
End: 2019-12-20

## 2019-12-20 ENCOUNTER — OFFICE VISIT (OUTPATIENT)
Dept: FAMILY MEDICINE CLINIC | Facility: CLINIC | Age: 49
End: 2019-12-20
Payer: COMMERCIAL

## 2019-12-20 ENCOUNTER — HOSPITAL ENCOUNTER (OUTPATIENT)
Dept: MAMMOGRAPHY | Facility: CLINIC | Age: 49
Discharge: HOME | End: 2019-12-20
Payer: COMMERCIAL

## 2019-12-20 ENCOUNTER — APPOINTMENT (OUTPATIENT)
Dept: LAB | Facility: CLINIC | Age: 49
End: 2019-12-20
Payer: COMMERCIAL

## 2019-12-20 VITALS
HEIGHT: 64 IN | WEIGHT: 216.5 LBS | BODY MASS INDEX: 36.96 KG/M2 | DIASTOLIC BLOOD PRESSURE: 80 MMHG | RESPIRATION RATE: 16 BRPM | SYSTOLIC BLOOD PRESSURE: 122 MMHG

## 2019-12-20 DIAGNOSIS — Z13.0 SCREENING FOR BLOOD DISEASE: ICD-10-CM

## 2019-12-20 DIAGNOSIS — Z12.31 SCREENING MAMMOGRAM, ENCOUNTER FOR: ICD-10-CM

## 2019-12-20 DIAGNOSIS — Z12.31 SCREENING MAMMOGRAM, ENCOUNTER FOR: Primary | ICD-10-CM

## 2019-12-20 DIAGNOSIS — Z13.29 SCREENING FOR THYROID DISORDER: ICD-10-CM

## 2019-12-20 DIAGNOSIS — Z13.220 SCREENING FOR CHOLESTEROL LEVEL: ICD-10-CM

## 2019-12-20 DIAGNOSIS — Z13.1 SCREENING FOR DIABETES MELLITUS: ICD-10-CM

## 2019-12-20 DIAGNOSIS — Z13.89 SCREENING FOR BLOOD OR PROTEIN IN URINE: ICD-10-CM

## 2019-12-20 DIAGNOSIS — Z01.419 ENCOUNTER FOR ROUTINE GYNECOLOGICAL EXAMINATION WITH PAPANICOLAOU SMEAR OF CERVIX: Primary | ICD-10-CM

## 2019-12-20 DIAGNOSIS — Z13.228 SCREENING FOR METABOLIC DISORDER: ICD-10-CM

## 2019-12-20 DIAGNOSIS — Z12.39 SCREENING FOR MALIGNANT NEOPLASM OF BREAST: ICD-10-CM

## 2019-12-20 LAB
ALBUMIN SERPL BCP-MCNC: 3.8 G/DL (ref 3.5–5)
ALP SERPL-CCNC: 46 U/L (ref 46–116)
ALT SERPL W P-5'-P-CCNC: 21 U/L (ref 12–78)
AMORPH URATE CRY URNS QL MICRO: ABNORMAL /HPF
ANION GAP SERPL CALCULATED.3IONS-SCNC: 4 MMOL/L (ref 4–13)
AST SERPL W P-5'-P-CCNC: 12 U/L (ref 5–45)
BACTERIA UR QL AUTO: ABNORMAL /HPF
BASOPHILS # BLD AUTO: 0.04 THOUSANDS/ΜL (ref 0–0.1)
BASOPHILS NFR BLD AUTO: 1 % (ref 0–1)
BILIRUB SERPL-MCNC: 0.4 MG/DL (ref 0.2–1)
BILIRUB UR QL STRIP: NEGATIVE
BUN SERPL-MCNC: 12 MG/DL (ref 5–25)
CALCIUM SERPL-MCNC: 9 MG/DL (ref 8.3–10.1)
CHLORIDE SERPL-SCNC: 106 MMOL/L (ref 100–108)
CHOLEST SERPL-MCNC: 218 MG/DL (ref 50–200)
CLARITY UR: CLEAR
CO2 SERPL-SCNC: 29 MMOL/L (ref 21–32)
COLOR UR: YELLOW
CREAT SERPL-MCNC: 0.61 MG/DL (ref 0.6–1.3)
EOSINOPHIL # BLD AUTO: 0.12 THOUSAND/ΜL (ref 0–0.61)
EOSINOPHIL NFR BLD AUTO: 1 % (ref 0–6)
ERYTHROCYTE [DISTWIDTH] IN BLOOD BY AUTOMATED COUNT: 13.2 % (ref 11.6–15.1)
GFR SERPL CREATININE-BSD FRML MDRD: 108 ML/MIN/1.73SQ M
GLUCOSE P FAST SERPL-MCNC: 76 MG/DL (ref 65–99)
GLUCOSE UR STRIP-MCNC: NEGATIVE MG/DL
HCT VFR BLD AUTO: 40.9 % (ref 34.8–46.1)
HDLC SERPL-MCNC: 73 MG/DL
HGB BLD-MCNC: 13.4 G/DL (ref 11.5–15.4)
HGB UR QL STRIP.AUTO: ABNORMAL
IMM GRANULOCYTES # BLD AUTO: 0.01 THOUSAND/UL (ref 0–0.2)
IMM GRANULOCYTES NFR BLD AUTO: 0 % (ref 0–2)
KETONES UR STRIP-MCNC: NEGATIVE MG/DL
LDLC SERPL CALC-MCNC: 117 MG/DL (ref 0–100)
LEUKOCYTE ESTERASE UR QL STRIP: NEGATIVE
LYMPHOCYTES # BLD AUTO: 3.11 THOUSANDS/ΜL (ref 0.6–4.47)
LYMPHOCYTES NFR BLD AUTO: 37 % (ref 14–44)
MCH RBC QN AUTO: 28.9 PG (ref 26.8–34.3)
MCHC RBC AUTO-ENTMCNC: 32.8 G/DL (ref 31.4–37.4)
MCV RBC AUTO: 88 FL (ref 82–98)
MONOCYTES # BLD AUTO: 0.39 THOUSAND/ΜL (ref 0.17–1.22)
MONOCYTES NFR BLD AUTO: 5 % (ref 4–12)
NEUTROPHILS # BLD AUTO: 4.64 THOUSANDS/ΜL (ref 1.85–7.62)
NEUTS SEG NFR BLD AUTO: 56 % (ref 43–75)
NITRITE UR QL STRIP: NEGATIVE
NON-SQ EPI CELLS URNS QL MICRO: ABNORMAL /HPF
NRBC BLD AUTO-RTO: 0 /100 WBCS
PH UR STRIP.AUTO: 6.5 [PH]
PLATELET # BLD AUTO: 254 THOUSANDS/UL (ref 149–390)
PMV BLD AUTO: 11.3 FL (ref 8.9–12.7)
POTASSIUM SERPL-SCNC: 3.9 MMOL/L (ref 3.5–5.3)
PROT SERPL-MCNC: 7.6 G/DL (ref 6.4–8.2)
PROT UR STRIP-MCNC: NEGATIVE MG/DL
RBC # BLD AUTO: 4.63 MILLION/UL (ref 3.81–5.12)
RBC #/AREA URNS AUTO: ABNORMAL /HPF
SODIUM SERPL-SCNC: 139 MMOL/L (ref 136–145)
SP GR UR STRIP.AUTO: 1.02 (ref 1–1.03)
TRIGL SERPL-MCNC: 140 MG/DL
TSH SERPL DL<=0.05 MIU/L-ACNC: 1.39 UIU/ML (ref 0.36–3.74)
UROBILINOGEN UR QL STRIP.AUTO: 0.2 E.U./DL
WBC # BLD AUTO: 8.31 THOUSAND/UL (ref 4.31–10.16)
WBC #/AREA URNS AUTO: ABNORMAL /HPF

## 2019-12-20 PROCEDURE — 99396 PREV VISIT EST AGE 40-64: CPT | Performed by: FAMILY MEDICINE

## 2019-12-20 PROCEDURE — 77063 BREAST TOMOSYNTHESIS BI: CPT

## 2019-12-20 PROCEDURE — 87624 HPV HI-RISK TYP POOLED RSLT: CPT | Performed by: FAMILY MEDICINE

## 2019-12-20 PROCEDURE — G0145 SCR C/V CYTO,THINLAYER,RESCR: HCPCS | Performed by: FAMILY MEDICINE

## 2019-12-20 PROCEDURE — 80061 LIPID PANEL: CPT

## 2019-12-20 PROCEDURE — 77067 SCR MAMMO BI INCL CAD: CPT

## 2019-12-20 PROCEDURE — 36415 COLL VENOUS BLD VENIPUNCTURE: CPT

## 2019-12-20 PROCEDURE — 85025 COMPLETE CBC W/AUTO DIFF WBC: CPT

## 2019-12-20 PROCEDURE — 80053 COMPREHEN METABOLIC PANEL: CPT

## 2019-12-20 PROCEDURE — 81001 URINALYSIS AUTO W/SCOPE: CPT | Performed by: FAMILY MEDICINE

## 2019-12-20 PROCEDURE — 84443 ASSAY THYROID STIM HORMONE: CPT

## 2019-12-20 NOTE — PROGRESS NOTES
ASSESSMENT & PLAN: Marcos Santacruz is a 54 y o   with normal gynecologic exam     1   Routine well woman exam done today  2    Pap and HPV:Pap with HPV was done today  Current ASCCP Guidelines reviewed  3   The patient declined STD testing  4  The patient is sexually active  She is doing well on her OCP's     5  The following were reviewed in today's visit: breast self exam, mammography screening ordered, use and side effects of OCPs, adequate intake of calcium and vitamin D, exercise and healthy diet, screening labs ordered  6  Patient to return to office in 12 months for annual physical      All questions have been answered to her satisfaction  CC:  Annual Gynecologic Examination    HPI: Marcos Santacruz is a 54 y o  Susan Jazmine who presents for annual gynecologic examination  She has the following concerns:  none    Health Maintenance:    She exercises 1 days per week  She wears her seatbelt routinely  She does perform regular monthly self breast exams  She feels safe at home  Patients does follow a balanced diet  History reviewed  No pertinent past medical history  Past Surgical History:   Procedure Laterality Date     SECTION      x 2    COLONOSCOPY N/A 2018    Procedure: COLONOSCOPY;  Surgeon: Silvana Payne DO;  Location: [facility]; Service: Gastroenterology       Past OB/Gyn History:   Patient's last menstrual period was 2019      History of sexually transmitted infection No  Patient is currently sexually active: heterosexual  Birth control:oral contraceptives (estrogen/progesterone)    Last Pap 2017:  no abnormalities;  HPV negative    Family History  Family History   Problem Relation Age of Onset    No Known Problems Mother     Hypertension Father     COPD Father     Cancer Brother     Colon cancer Paternal Grandmother     Breast cancer Maternal Aunt 58    Diabetes Maternal Grandmother     No Known Problems Daughter     No Known Problems Paternal Aunt     Substance Abuse Neg Hx     Mental illness Neg Hx        Social History:  Social History     Socioeconomic History    Marital status: /Civil Union     Spouse name: Not on file    Number of children: Not on file    Years of education: Not on file    Highest education level: Not on file   Occupational History    Not on file   Social Needs    Financial resource strain: Not on file    Food insecurity:     Worry: Not on file     Inability: Not on file    Transportation needs:     Medical: Not on file     Non-medical: Not on file   Tobacco Use    Smoking status: Never Smoker    Smokeless tobacco: Never Used   Substance and Sexual Activity    Alcohol use: Yes     Comment: occassionally    Drug use: No    Sexual activity: Not on file   Lifestyle    Physical activity:     Days per week: Not on file     Minutes per session: Not on file    Stress: Not on file   Relationships    Social connections:     Talks on phone: Not on file     Gets together: Not on file     Attends Hinduism service: Not on file     Active member of club or organization: Not on file     Attends meetings of clubs or organizations: Not on file     Relationship status: Not on file    Intimate partner violence:     Fear of current or ex partner: Not on file     Emotionally abused: Not on file     Physically abused: Not on file     Forced sexual activity: Not on file   Other Topics Concern    Not on file   Social History Narrative    Not on file     Patient is   Patient is currently employed  Allergies:   Allergies   Allergen Reactions    Other Allergic Rhinitis     Seasonal        Medications:    Current Outpatient Medications:     albuterol (PROVENTIL HFA,VENTOLIN HFA) 90 mcg/act inhaler, Inhale 2 puffs every 6 (six) hours as needed for wheezing, Disp: , Rfl:     cetirizine (ZYRTEC ALLERGY) 10 mg tablet, Take 10 mg by mouth daily, Disp: , Rfl:     fluticasone (FLONASE) 50 mcg/act nasal spray, 2 sprays into each nostril daily, Disp: , Rfl:     LOW-OGESTREL 0 3-30 MG-MCG per tablet, TAKE 1 TABLET DAILY, Disp: 84 tablet, Rfl: 1    Review of Systems:  Review of Systems   Constitutional: Negative for appetite change, chills, fatigue, fever and unexpected weight change  HENT: Negative for congestion, ear pain, nosebleeds, rhinorrhea, sore throat and trouble swallowing  Eyes: Negative for pain, discharge, redness, itching and visual disturbance  Respiratory: Negative for cough, shortness of breath and wheezing  Cardiovascular: Negative for chest pain, palpitations and leg swelling  Gastrointestinal: Negative for abdominal pain, blood in stool, constipation, diarrhea, nausea and vomiting  Endocrine: Negative for cold intolerance, heat intolerance, polydipsia, polyphagia and polyuria  Genitourinary: Negative for dysuria, frequency, hematuria, pelvic pain, urgency and vaginal discharge  Musculoskeletal: Negative for arthralgias, back pain, gait problem, joint swelling and myalgias  Skin: Negative for rash  Neurological: Negative for dizziness, syncope, weakness, numbness and headaches  Hematological: Negative for adenopathy  Psychiatric/Behavioral: Negative for dysphoric mood and sleep disturbance  The patient is not nervous/anxious  Physical Exam:  /80 (BP Location: Left arm, Patient Position: Sitting, Cuff Size: Adult)   Resp 16   Ht 5' 4" (1 626 m)   Wt 98 2 kg (216 lb 8 oz)   LMP 12/12/2019   BMI 37 16 kg/m²    Physical Exam   Constitutional: She is oriented to person, place, and time  She appears well-developed and well-nourished  No distress  Genitourinary: Vagina normal and uterus normal  There is no rash, tenderness or lesion on the right labia  There is no rash, tenderness or lesion on the left labia  Right adnexum does not display mass, does not display tenderness and does not display fullness   Left adnexum does not display mass, does not display tenderness and does not display fullness  Cervix exhibits friability  Cervix does not exhibit motion tenderness, lesion or discharge  Neck: Neck supple  No thyromegaly present  Cardiovascular: Normal rate, regular rhythm and normal heart sounds  No murmur heard  Pulmonary/Chest: Effort normal and breath sounds normal  Right breast exhibits no inverted nipple, no mass, no nipple discharge, no skin change and no tenderness  Left breast exhibits no inverted nipple, no mass, no nipple discharge, no skin change and no tenderness  Abdominal: Soft  She exhibits no distension and no mass  There is no tenderness  Hernia confirmed negative in the right inguinal area and confirmed negative in the left inguinal area  Musculoskeletal: She exhibits no edema  Lymphadenopathy:     She has no cervical adenopathy  Right: No inguinal adenopathy present  Left: No inguinal adenopathy present  Neurological: She is alert and oriented to person, place, and time  Psychiatric: She has a normal mood and affect  Her behavior is normal  Thought content normal      BMI Counseling: Body mass index is 37 16 kg/m²  The BMI is above normal  Nutrition recommendations include consuming healthier snacks, moderation in carbohydrate intake and reducing intake of saturated fat and trans fat  Exercise recommendations include exercising 3-5 times per week

## 2019-12-23 LAB
HPV HR 12 DNA CVX QL NAA+PROBE: NEGATIVE
HPV16 DNA CVX QL NAA+PROBE: NEGATIVE
HPV18 DNA CVX QL NAA+PROBE: NEGATIVE

## 2019-12-27 ENCOUNTER — TELEPHONE (OUTPATIENT)
Dept: FAMILY MEDICINE CLINIC | Facility: CLINIC | Age: 49
End: 2019-12-27

## 2019-12-27 DIAGNOSIS — Z30.41 ORAL CONTRACEPTIVE USE: ICD-10-CM

## 2019-12-27 NOTE — TELEPHONE ENCOUNTER
Patient is on vacation and forgot her Low Ogestrel tablets  She needs 1 pack called into       [address] [place] [address] , [address] [place] [address]

## 2019-12-27 NOTE — TELEPHONE ENCOUNTER
I can not find the requested pharmacy in [address] [place] [address] Rd  Please see if you can find it

## 2019-12-30 LAB
LAB AP GYN PRIMARY INTERPRETATION: NORMAL
Lab: NORMAL

## 2020-04-06 ENCOUNTER — TELEPHONE (OUTPATIENT)
Dept: FAMILY MEDICINE CLINIC | Facility: CLINIC | Age: 50
End: 2020-04-06

## 2020-04-06 DIAGNOSIS — R09.81 NASAL CONGESTION: Primary | ICD-10-CM

## 2020-04-06 DIAGNOSIS — Z30.41 ORAL CONTRACEPTIVE USE: ICD-10-CM

## 2020-04-06 RX ORDER — FLUTICASONE PROPIONATE 50 MCG
2 SPRAY, SUSPENSION (ML) NASAL DAILY
Qty: 3 BOTTLE | Refills: 1 | Status: SHIPPED | OUTPATIENT
Start: 2020-04-06

## 2020-09-18 ENCOUNTER — IMMUNIZATIONS (OUTPATIENT)
Dept: FAMILY MEDICINE CLINIC | Facility: CLINIC | Age: 50
End: 2020-09-18
Payer: COMMERCIAL

## 2020-09-18 DIAGNOSIS — Z23 ENCOUNTER FOR IMMUNIZATION: ICD-10-CM

## 2020-09-18 PROCEDURE — 90686 IIV4 VACC NO PRSV 0.5 ML IM: CPT

## 2020-09-18 PROCEDURE — 90471 IMMUNIZATION ADMIN: CPT

## 2020-12-16 DIAGNOSIS — Z30.41 ORAL CONTRACEPTIVE USE: ICD-10-CM

## 2020-12-16 RX ORDER — NORGESTREL-ETHINYL ESTRADIOL 0.3-0.03MG
TABLET ORAL
Qty: 84 TABLET | Refills: 2 | Status: SHIPPED | OUTPATIENT
Start: 2020-12-16 | End: 2020-12-18 | Stop reason: SDUPTHER

## 2020-12-18 DIAGNOSIS — Z30.41 ORAL CONTRACEPTIVE USE: ICD-10-CM

## 2020-12-18 RX ORDER — NORGESTREL-ETHINYL ESTRADIOL 0.3-0.03MG
1 TABLET ORAL DAILY
Qty: 84 TABLET | Refills: 2 | Status: SHIPPED | OUTPATIENT
Start: 2020-12-18 | End: 2021-12-13

## 2021-04-02 ENCOUNTER — OFFICE VISIT (OUTPATIENT)
Dept: FAMILY MEDICINE CLINIC | Facility: CLINIC | Age: 51
End: 2021-04-02
Payer: COMMERCIAL

## 2021-04-02 VITALS
DIASTOLIC BLOOD PRESSURE: 80 MMHG | WEIGHT: 221.2 LBS | HEIGHT: 64 IN | HEART RATE: 82 BPM | BODY MASS INDEX: 37.76 KG/M2 | RESPIRATION RATE: 16 BRPM | SYSTOLIC BLOOD PRESSURE: 138 MMHG

## 2021-04-02 DIAGNOSIS — Z13.1 SCREENING FOR DIABETES MELLITUS: ICD-10-CM

## 2021-04-02 DIAGNOSIS — Z13.89 SCREENING FOR BLOOD OR PROTEIN IN URINE: ICD-10-CM

## 2021-04-02 DIAGNOSIS — Z13.228 SCREENING FOR METABOLIC DISORDER: ICD-10-CM

## 2021-04-02 DIAGNOSIS — Z13.220 SCREENING FOR CHOLESTEROL LEVEL: ICD-10-CM

## 2021-04-02 DIAGNOSIS — Z00.00 ANNUAL PHYSICAL EXAM: Primary | ICD-10-CM

## 2021-04-02 DIAGNOSIS — Z13.0 SCREENING FOR BLOOD DISEASE: ICD-10-CM

## 2021-04-02 DIAGNOSIS — Z12.31 ENCOUNTER FOR SCREENING MAMMOGRAM FOR MALIGNANT NEOPLASM OF BREAST: ICD-10-CM

## 2021-04-02 DIAGNOSIS — Z13.29 SCREENING FOR THYROID DISORDER: ICD-10-CM

## 2021-04-02 PROCEDURE — 3008F BODY MASS INDEX DOCD: CPT | Performed by: FAMILY MEDICINE

## 2021-04-02 PROCEDURE — 3725F SCREEN DEPRESSION PERFORMED: CPT | Performed by: FAMILY MEDICINE

## 2021-04-02 PROCEDURE — 1036F TOBACCO NON-USER: CPT | Performed by: FAMILY MEDICINE

## 2021-04-02 PROCEDURE — 99396 PREV VISIT EST AGE 40-64: CPT | Performed by: FAMILY MEDICINE

## 2021-04-02 NOTE — PROGRESS NOTES
ADULT ANNUAL PHYSICAL  [facility]    NAME: Rosibel Connors  AGE: 52 y o  SEX: female  : 1970     DATE: 2021     Assessment and Plan:     1  Annual physical exam  - will obtain screening labs and call with the results  - Lipid Panel with Direct LDL reflex; Future  - CBC and differential; Future  - Comprehensive metabolic panel; Future  - UA (URINE) with reflex to Scope  - TSH, 3rd generation with Free T4 reflex; Future    2  Encounter for screening mammogram for malignant neoplasm of breast  - Mammo screening bilateral w 3d & cad; Future      Immunizations and preventive care screenings were discussed with patient today  Appropriate education was printed on patient's after visit summary  Counseling:  Alcohol/drug use: discussed moderation in alcohol intake, the recommendations for healthy alcohol use, and avoidance of illicit drug use  Dental Health: discussed importance of regular tooth brushing, flossing, and dental visits  Injury prevention: discussed safety/seat belts, safety helmets, smoke detectors, carbon dioxide detectors, and smoking near bedding or upholstery  · Exercise: the importance of regular exercise/physical activity was discussed  Recommend exercise 3-5 times per week for at least 30 minutes  Return for annual physical in 1 year  Chief Complaint:     Chief Complaint   Patient presents with    Gynecologic Exam    Physical Exam      History of Present Illness:     Adult Annual Physical   Patient here for a comprehensive physical exam  The patient reports no problems  Diet and Physical Activity  · Diet/Nutrition: well balanced diet  · Exercise: walking        Depression Screening  PHQ-9 Depression Screening    PHQ-9:   Frequency of the following problems over the past two weeks:      Little interest or pleasure in doing things: 0 - not at all  Feeling down, depressed, or hopeless: 0 - not at all  PHQ-2 Score: 0       General Health  · Sleep: sleeps well  · Hearing: normal - bilateral   · Vision: goes for regular eye exams  · Dental: regular dental visits  /GYN Health  · Patient is: premenopausal  · Last menstrual period: regular periods with light flow  · Contraceptive method: oral contraceptives  Review of Systems:     Review of Systems   Constitutional: Negative for appetite change, chills, fatigue, fever and unexpected weight change  HENT: Negative for congestion, ear pain, nosebleeds, rhinorrhea, sore throat and trouble swallowing  Eyes: Negative for pain, discharge, redness, itching and visual disturbance  Respiratory: Negative for cough, shortness of breath and wheezing  Cardiovascular: Negative for chest pain, palpitations and leg swelling  Gastrointestinal: Negative for abdominal pain, blood in stool, constipation, diarrhea, nausea and vomiting  Endocrine: Negative for cold intolerance, heat intolerance, polydipsia and polyuria  Genitourinary: Negative for dysuria, frequency, hematuria, pelvic pain, urgency and vaginal discharge  Musculoskeletal: Negative for arthralgias, back pain, joint swelling and myalgias  Skin: Negative for rash  Neurological: Negative for dizziness, syncope, weakness, numbness and headaches  Hematological: Negative for adenopathy  Psychiatric/Behavioral: Negative for dysphoric mood and sleep disturbance  The patient is not nervous/anxious  Past Medical History:     History reviewed  No pertinent past medical history  Past Surgical History:     Past Surgical History:   Procedure Laterality Date     SECTION      x 2    COLONOSCOPY N/A 2018    Procedure: COLONOSCOPY;  Surgeon: Ronald Currie DO;  Location: [facility];   Service: Gastroenterology      Social History:        Social History     Socioeconomic History    Marital status: /Civil Union     Spouse name: None    Number of children: None    Years of education: None    Highest education level: None   Occupational History    None   Social Needs    Financial resource strain: None    Food insecurity     Worry: None     Inability: None    Transportation needs     Medical: None     Non-medical: None   Tobacco Use    Smoking status: Never Smoker    Smokeless tobacco: Never Used   Substance and Sexual Activity    Alcohol use: Yes     Comment: occassionally    Drug use: No    Sexual activity: None   Lifestyle    Physical activity     Days per week: None     Minutes per session: None    Stress: None   Relationships    Social connections     Talks on phone: None     Gets together: None     Attends L[employer] service: None     Active member of club or organization: None     Attends meetings of clubs or organizations: None     Relationship status: None    Intimate partner violence     Fear of current or ex partner: None     Emotionally abused: None     Physically abused: None     Forced sexual activity: None   Other Topics Concern    None   Social History Narrative    None      Family History:     Family History   Problem Relation Age of Onset    No Known Problems Mother     Hypertension Father     COPD Father     Cancer Brother     Colon cancer Paternal Grandmother     Breast cancer Maternal Aunt 58    Diabetes Maternal Grandmother     No Known Problems Daughter     No Known Problems Paternal Aunt     Substance Abuse Neg Hx     Mental illness Neg Hx       Current Medications:     Current Outpatient Medications   Medication Sig Dispense Refill    cetirizine (ZYRTEC ALLERGY) 10 mg tablet Take 10 mg by mouth daily      fluticasone (FLONASE) 50 mcg/act nasal spray 2 sprays into each nostril daily 3 Bottle 1    norgestrel-ethinyl estradiol (Cryselle-28) 0 3 mg-30 mcg per tablet Take 1 tablet by mouth daily 84 tablet 2     No current facility-administered medications for this visit  Allergies:      Allergies   Allergen Reactions    Other Allergic Rhinitis     Seasonal       Physical Exam:     /80   Pulse 82   Resp 16   Ht 5' 4" (1 626 m)   Wt 100 kg (221 lb 3 2 oz)   BMI 37 97 kg/m²     Physical Exam  Constitutional:       General: She is not in acute distress  Appearance: She is well-developed  HENT:      Head: Normocephalic and atraumatic  Right Ear: Tympanic membrane, ear canal and external ear normal       Left Ear: Tympanic membrane, ear canal and external ear normal       Mouth/Throat:      Mouth: Mucous membranes are moist       Pharynx: Oropharynx is clear  Eyes:      General: No scleral icterus  Extraocular Movements: Extraocular movements intact  Conjunctiva/sclera: Conjunctivae normal       Pupils: Pupils are equal, round, and reactive to light  Neck:      Musculoskeletal: Neck supple  Thyroid: No thyromegaly  Vascular: No JVD  Cardiovascular:      Rate and Rhythm: Normal rate and regular rhythm  Heart sounds: Normal heart sounds  No murmur  Pulmonary:      Effort: Pulmonary effort is normal  No respiratory distress  Breath sounds: Normal breath sounds  No wheezing, rhonchi or rales  Abdominal:      General: Bowel sounds are normal  There is no distension  Palpations: Abdomen is soft  There is no mass  Tenderness: There is no abdominal tenderness  Musculoskeletal:      Right lower leg: No edema  Left lower leg: No edema  Lymphadenopathy:      Cervical: No cervical adenopathy  Skin:     Findings: No rash  Neurological:      General: No focal deficit present  Mental Status: She is alert and oriented to person, place, and time  Cranial Nerves: No cranial nerve deficit  Psychiatric:         Mood and Affect: Mood normal          Behavior: Behavior normal          Thought Content:  Thought content normal          Judgment: Judgment normal         Patsy Boeck, MD  [facility]: Body mass index is 37 97 kg/m²  The BMI is above normal  Nutrition recommendations include 3-5 servings of fruits/vegetables daily and consuming healthier snacks  Exercise recommendations include exercising 3-5 times per week

## 2021-04-02 NOTE — PATIENT INSTRUCTIONS

## 2021-04-10 ENCOUNTER — IMMUNIZATIONS (OUTPATIENT)
Dept: FAMILY MEDICINE CLINIC | Facility: HOSPITAL | Age: 51
End: 2021-04-10

## 2021-04-10 DIAGNOSIS — Z23 ENCOUNTER FOR IMMUNIZATION: Primary | ICD-10-CM

## 2021-04-10 PROCEDURE — 91300 SARS-COV-2 / COVID-19 MRNA VACCINE (PFIZER-BIONTECH) 30 MCG: CPT

## 2021-04-10 PROCEDURE — 0001A SARS-COV-2 / COVID-19 MRNA VACCINE (PFIZER-BIONTECH) 30 MCG: CPT

## 2021-05-04 ENCOUNTER — IMMUNIZATIONS (OUTPATIENT)
Dept: FAMILY MEDICINE CLINIC | Facility: HOSPITAL | Age: 51
End: 2021-05-04

## 2021-05-04 DIAGNOSIS — Z23 ENCOUNTER FOR IMMUNIZATION: Primary | ICD-10-CM

## 2021-05-04 PROCEDURE — 0002A SARS-COV-2 / COVID-19 MRNA VACCINE (PFIZER-BIONTECH) 30 MCG: CPT

## 2021-05-04 PROCEDURE — 91300 SARS-COV-2 / COVID-19 MRNA VACCINE (PFIZER-BIONTECH) 30 MCG: CPT

## 2021-12-12 DIAGNOSIS — Z30.41 ORAL CONTRACEPTIVE USE: ICD-10-CM

## 2021-12-13 RX ORDER — NORGESTREL AND ETHINYL ESTRADIOL 0.3-0.03MG
KIT ORAL
Qty: 84 TABLET | Refills: 1 | Status: SHIPPED | OUTPATIENT
Start: 2021-12-13 | End: 2022-05-24

## 2022-05-27 ENCOUNTER — TELEPHONE (OUTPATIENT)
Dept: FAMILY MEDICINE CLINIC | Facility: CLINIC | Age: 52
End: 2022-05-27

## 2022-05-27 NOTE — TELEPHONE ENCOUNTER
Patient is calling she took a at home covid test from [employer] last night  Patient is positive      Symptoms started yesterday 5/26  Cough  Body ahces  Stuffy nose  Sore throat  Wheezy in chest

## 2022-06-03 ENCOUNTER — RA CDI HCC (OUTPATIENT)
Dept: OTHER | Facility: HOSPITAL | Age: 52
End: 2022-06-03

## 2022-06-03 NOTE — PROGRESS NOTES
[facility]  [facility]       Chart reviewed, no opportunity found: CHART REVIEWED, NO OPPORTUNITY FOUND        Patients Insurance        Commercial Insurance: [address] [place] [address]
Improved.

## 2022-06-17 ENCOUNTER — APPOINTMENT (OUTPATIENT)
Dept: LAB | Facility: CLINIC | Age: 52
End: 2022-06-17
Payer: COMMERCIAL

## 2022-06-17 ENCOUNTER — OFFICE VISIT (OUTPATIENT)
Dept: FAMILY MEDICINE CLINIC | Facility: CLINIC | Age: 52
End: 2022-06-17
Payer: COMMERCIAL

## 2022-06-17 VITALS
WEIGHT: 223 LBS | HEIGHT: 64 IN | HEART RATE: 83 BPM | SYSTOLIC BLOOD PRESSURE: 122 MMHG | DIASTOLIC BLOOD PRESSURE: 80 MMHG | BODY MASS INDEX: 38.07 KG/M2 | RESPIRATION RATE: 16 BRPM

## 2022-06-17 DIAGNOSIS — Z00.00 ANNUAL PHYSICAL EXAM: Primary | ICD-10-CM

## 2022-06-17 DIAGNOSIS — Z12.31 ENCOUNTER FOR SCREENING MAMMOGRAM FOR MALIGNANT NEOPLASM OF BREAST: ICD-10-CM

## 2022-06-17 DIAGNOSIS — M25.561 CHRONIC PAIN OF RIGHT KNEE: ICD-10-CM

## 2022-06-17 DIAGNOSIS — G89.29 CHRONIC PAIN OF RIGHT KNEE: ICD-10-CM

## 2022-06-17 DIAGNOSIS — M77.12 LATERAL EPICONDYLITIS, LEFT ELBOW: ICD-10-CM

## 2022-06-17 DIAGNOSIS — Z00.00 ANNUAL PHYSICAL EXAM: ICD-10-CM

## 2022-06-17 LAB
ALBUMIN SERPL BCP-MCNC: 3.4 G/DL (ref 3.5–5)
ALP SERPL-CCNC: 42 U/L (ref 46–116)
ALT SERPL W P-5'-P-CCNC: 16 U/L (ref 12–78)
ANION GAP SERPL CALCULATED.3IONS-SCNC: 5 MMOL/L (ref 4–13)
AST SERPL W P-5'-P-CCNC: 10 U/L (ref 5–45)
BASOPHILS # BLD AUTO: 0.04 THOUSANDS/ΜL (ref 0–0.1)
BASOPHILS NFR BLD AUTO: 1 % (ref 0–1)
BILIRUB SERPL-MCNC: 0.54 MG/DL (ref 0.2–1)
BUN SERPL-MCNC: 12 MG/DL (ref 5–25)
CALCIUM ALBUM COR SERPL-MCNC: 9.5 MG/DL (ref 8.3–10.1)
CALCIUM SERPL-MCNC: 9 MG/DL (ref 8.3–10.1)
CHLORIDE SERPL-SCNC: 107 MMOL/L (ref 100–108)
CHOLEST SERPL-MCNC: 216 MG/DL
CO2 SERPL-SCNC: 25 MMOL/L (ref 21–32)
CREAT SERPL-MCNC: 0.64 MG/DL (ref 0.6–1.3)
EOSINOPHIL # BLD AUTO: 0.12 THOUSAND/ΜL (ref 0–0.61)
EOSINOPHIL NFR BLD AUTO: 2 % (ref 0–6)
ERYTHROCYTE [DISTWIDTH] IN BLOOD BY AUTOMATED COUNT: 13.5 % (ref 11.6–15.1)
GFR SERPL CREATININE-BSD FRML MDRD: 105 ML/MIN/1.73SQ M
GLUCOSE P FAST SERPL-MCNC: 89 MG/DL (ref 65–99)
HCT VFR BLD AUTO: 39.8 % (ref 34.8–46.1)
HDLC SERPL-MCNC: 64 MG/DL
HGB BLD-MCNC: 13.2 G/DL (ref 11.5–15.4)
IMM GRANULOCYTES # BLD AUTO: 0.02 THOUSAND/UL (ref 0–0.2)
IMM GRANULOCYTES NFR BLD AUTO: 0 % (ref 0–2)
LDLC SERPL CALC-MCNC: 123 MG/DL (ref 0–100)
LYMPHOCYTES # BLD AUTO: 2.55 THOUSANDS/ΜL (ref 0.6–4.47)
LYMPHOCYTES NFR BLD AUTO: 34 % (ref 14–44)
MCH RBC QN AUTO: 29.9 PG (ref 26.8–34.3)
MCHC RBC AUTO-ENTMCNC: 33.2 G/DL (ref 31.4–37.4)
MCV RBC AUTO: 90 FL (ref 82–98)
MONOCYTES # BLD AUTO: 0.44 THOUSAND/ΜL (ref 0.17–1.22)
MONOCYTES NFR BLD AUTO: 6 % (ref 4–12)
NEUTROPHILS # BLD AUTO: 4.25 THOUSANDS/ΜL (ref 1.85–7.62)
NEUTS SEG NFR BLD AUTO: 57 % (ref 43–75)
NRBC BLD AUTO-RTO: 0 /100 WBCS
PLATELET # BLD AUTO: 264 THOUSANDS/UL (ref 149–390)
PMV BLD AUTO: 11.6 FL (ref 8.9–12.7)
POTASSIUM SERPL-SCNC: 4.3 MMOL/L (ref 3.5–5.3)
PROT SERPL-MCNC: 7.4 G/DL (ref 6.4–8.2)
RBC # BLD AUTO: 4.41 MILLION/UL (ref 3.81–5.12)
SODIUM SERPL-SCNC: 137 MMOL/L (ref 136–145)
TRIGL SERPL-MCNC: 144 MG/DL
TSH SERPL DL<=0.05 MIU/L-ACNC: 1.27 UIU/ML (ref 0.45–4.5)
WBC # BLD AUTO: 7.42 THOUSAND/UL (ref 4.31–10.16)

## 2022-06-17 PROCEDURE — 85025 COMPLETE CBC W/AUTO DIFF WBC: CPT

## 2022-06-17 PROCEDURE — 3008F BODY MASS INDEX DOCD: CPT | Performed by: PHYSICIAN ASSISTANT

## 2022-06-17 PROCEDURE — 80053 COMPREHEN METABOLIC PANEL: CPT

## 2022-06-17 PROCEDURE — 36415 COLL VENOUS BLD VENIPUNCTURE: CPT

## 2022-06-17 PROCEDURE — 99213 OFFICE O/P EST LOW 20 MIN: CPT | Performed by: PHYSICIAN ASSISTANT

## 2022-06-17 PROCEDURE — 99396 PREV VISIT EST AGE 40-64: CPT | Performed by: PHYSICIAN ASSISTANT

## 2022-06-17 PROCEDURE — 3725F SCREEN DEPRESSION PERFORMED: CPT | Performed by: PHYSICIAN ASSISTANT

## 2022-06-17 PROCEDURE — 1036F TOBACCO NON-USER: CPT | Performed by: PHYSICIAN ASSISTANT

## 2022-06-17 PROCEDURE — 80061 LIPID PANEL: CPT

## 2022-06-17 PROCEDURE — 84443 ASSAY THYROID STIM HORMONE: CPT

## 2022-06-17 NOTE — PROGRESS NOTES
ADULT ANNUAL PHYSICAL  [facility]    NAME: Artem Deal  AGE: 54 y o  SEX: female  : 1970     DATE: 2022     Assessment and Plan:     Healthy 54year old female    Immunizations and preventive care screenings were discussed with patient today  Appropriate education was printed on patient's after visit summary  Counseling:  Alcohol/drug use: discussed moderation in alcohol intake, the recommendations for healthy alcohol use, and avoidance of illicit drug use  Dental Health: discussed importance of regular tooth brushing, flossing, and dental visits  Injury prevention: discussed safety/seat belts, safety helmets, smoke detectors, carbon dioxide detectors, and smoking near bedding or upholstery  Sexual health: discussed sexually transmitted diseases, partner selection, use of condoms, avoidance of unintended pregnancy, and contraceptive alternatives  · Exercise: the importance of regular exercise/physical activity was discussed  Recommend exercise 3-5 times per week for at least 30 minutes  · Labs ordered  · Encouraged to schedule mammo         Return in 1 year (on 2023)  Chief Complaint:     Chief Complaint   Patient presents with    Knee Pain     R    Physical Exam    Weakness - Generalized     L arm      History of Present Illness:     Adult Annual Physical   Patient here for a comprehensive physical exam  The patient reports no problems  Diet and Physical Activity  · Diet/Nutrition: well balanced diet  · Exercise: walking, 5-7 times a week on average and 30-60 minutes on average        Depression Screening  PHQ-2/9 Depression Screening    Little interest or pleasure in doing things: 0 - not at all  Feeling down, depressed, or hopeless: 0 - not at all  PHQ-2 Score: 0  PHQ-2 Interpretation: Negative depression screen       General Health  · Sleep: sleeps well and gets 7-8 hours of sleep on average  · Hearing: normal - bilateral   · Vision: goes for regular eye exams, most recent eye exam <1 year ago and wears glasses  · Dental: regular dental visits and brushes teeth twice daily  /GYN Health  · Patient is: premenopausal  · Pap due   · mammo due now     Review of Systems:     Review of Systems   Constitutional: Negative  HENT: Negative  Eyes: Negative  Respiratory: Negative  Cardiovascular: Negative  Gastrointestinal: Negative  Endocrine: Negative  Genitourinary: Negative  Musculoskeletal: Negative  Skin: Negative  Allergic/Immunologic: Negative  Neurological: Negative  Hematological: Negative  Psychiatric/Behavioral: Negative  Past Medical History:     History reviewed  No pertinent past medical history  Past Surgical History:     Past Surgical History:   Procedure Laterality Date     SECTION      x 2    COLONOSCOPY N/A 2018    Procedure: COLONOSCOPY;  Surgeon: Romana Barth, DO;  Location: [facility] GI LAB;   Service: Gastroenterology      Social History:     Social History     Socioeconomic History    Marital status: /Civil Union     Spouse name: None    Number of children: None    Years of education: None    Highest education level: None   Occupational History    None   Tobacco Use    Smoking status: Never Smoker    Smokeless tobacco: Never Used   Vaping Use    Vaping Use: Never used   Substance and Sexual Activity    Alcohol use: Yes     Comment: occassionally    Drug use: No    Sexual activity: None   Other Topics Concern    None   Social History Narrative    None     Social Determinants of Health     Financial Resource Strain: Not on file   Food Insecurity: Not on file   Transportation Needs: Not on file   Physical Activity: Not on file   Stress: Not on file   Social Connections: Not on file   Intimate Partner Violence: Not on file   Housing Stability: Not on file      Family History:     Family History   Problem Relation Age of Onset    No Known Problems Mother     Hypertension Father     COPD Father     Cancer Brother     No Known Problems Daughter     Diabetes Maternal Grandmother     Colon cancer Paternal Grandmother     Breast cancer Maternal Aunt 58    No Known Problems Paternal Aunt     Substance Abuse Neg Hx     Mental illness Neg Hx       Current Medications:     Current Outpatient Medications   Medication Sig Dispense Refill    cetirizine (ZyrTEC) 10 mg tablet Take 10 mg by mouth daily      fluticasone (FLONASE) 50 mcg/act nasal spray 2 sprays into each nostril daily 3 Bottle 1    Low-Ogestrel 0 3-30 MG-MCG per tablet TAKE 1 TABLET BY MOUTH EVERY DAY 84 tablet 0     No current facility-administered medications for this visit  Allergies: Allergies   Allergen Reactions    Other Allergic Rhinitis     Seasonal       Physical Exam:     /80   Pulse 83   Resp 16   Ht 5' 4" (1 626 m)   Wt 101 kg (223 lb)   BMI 38 28 kg/m²     Physical Exam  Constitutional:       Appearance: Normal appearance  She is well-developed and normal weight  HENT:      Head: Normocephalic and atraumatic  Right Ear: Hearing normal       Left Ear: Hearing normal       Mouth/Throat:      Pharynx: Uvula midline  Eyes:      Extraocular Movements: Extraocular movements intact  Conjunctiva/sclera: Conjunctivae normal       Pupils: Pupils are equal, round, and reactive to light  Neck:      Thyroid: No thyromegaly  Cardiovascular:      Rate and Rhythm: Normal rate and regular rhythm  Pulses: Normal pulses  Heart sounds: Normal heart sounds  No murmur heard  Pulmonary:      Effort: Pulmonary effort is normal       Breath sounds: Normal breath sounds  Abdominal:      General: Bowel sounds are normal  There is no distension  Palpations: Abdomen is soft  There is no mass  Tenderness: There is no abdominal tenderness     Musculoskeletal:         General: Normal range of motion  Cervical back: Normal range of motion and neck supple  Lymphadenopathy:      Cervical: No cervical adenopathy  Skin:     General: Skin is warm  Neurological:      General: No focal deficit present  Mental Status: She is alert and oriented to person, place, and time  Cranial Nerves: No cranial nerve deficit  Deep Tendon Reflexes: Reflexes normal    Psychiatric:         Mood and Affect: Mood normal          Behavior: Behavior normal          Thought Content:  Thought content normal          Judgment: Judgment normal           VEDA Bowles

## 2022-06-17 NOTE — PATIENT INSTRUCTIONS

## 2022-06-17 NOTE — PROGRESS NOTES
Assessment/Plan:    1  Lateral epicondylitis, left elbow    - discussed getting an tennis elbow strap, RICE, voltaren gel topically to area 4 x a day, will refer to PT  - Ambulatory Referral to Physical Therapy; Future    2  Chronic pain of right knee    - most likely OA due to exam and history, will refer to PT  - Ambulatory Referral to Physical Therapy; Future    F/u as needed        Subjective:   Chief Complaint   Patient presents with    Knee Pain     R    Physical Exam    Weakness - Generalized     L arm      Patient ID: Darrius Mcmanus is a 54 y o  female  Patient with off and on right knee pain, got worse in May after covid  Aches worse after not using it, prolong sitting  The more she walks the better it gets  Can feel unhinged  Denies swelling  Denies taking OTC  Denies trauma  Patient complaining of left arm weakness, strain since May 9  Competed in competition where she was hammering nails into a wood, then took a bottle cap off a beer and now has pain  L handed  Tried no OTC  The following portions of the patient's history were reviewed and updated as appropriate: allergies, current medications, past family history, past medical history, past social history, past surgical history and problem list     History reviewed  No pertinent past medical history  Past Surgical History:   Procedure Laterality Date     SECTION      x 2    COLONOSCOPY N/A 2018    Procedure: COLONOSCOPY;  Surgeon: Joanna Rivera DO;  Location: [facility];   Service: Gastroenterology     Family History   Problem Relation Age of Onset    No Known Problems Mother     Hypertension Father     COPD Father     Cancer Brother     No Known Problems Daughter     Diabetes Maternal Grandmother     Colon cancer Paternal Grandmother     Breast cancer Maternal Aunt 58    No Known Problems Paternal Aunt     Substance Abuse Neg Hx     Mental illness Neg Hx      Social History     Socioeconomic History  Marital status: /Civil Union     Spouse name: Not on file    Number of children: Not on file    Years of education: Not on file    Highest education level: Not on file   Occupational History    Not on file   Tobacco Use    Smoking status: Never Smoker    Smokeless tobacco: Never Used   Vaping Use    Vaping Use: Never used   Substance and Sexual Activity    Alcohol use: Yes     Comment: occassionally    Drug use: No    Sexual activity: Not on file   Other Topics Concern    Not on file   Social History Narrative    Not on file     Social Determinants of Health     Financial Resource Strain: Not on file   Food Insecurity: Not on file   Transportation Needs: Not on file   Physical Activity: Not on file   Stress: Not on file   Social Connections: Not on file   Intimate Partner Violence: Not on file   Housing Stability: Not on file       Current Outpatient Medications:     cetirizine (ZyrTEC) 10 mg tablet, Take 10 mg by mouth daily, Disp: , Rfl:     fluticasone (FLONASE) 50 mcg/act nasal spray, 2 sprays into each nostril daily, Disp: 3 Bottle, Rfl: 1    Low-Ogestrel 0 3-30 MG-MCG per tablet, TAKE 1 TABLET BY MOUTH EVERY DAY, Disp: 84 tablet, Rfl: 0    Review of Systems          Objective:    Vitals:    06/17/22 0911   BP: 122/80   Pulse: 83   Resp: 16   Weight: 101 kg (223 lb)   Height: 5' 4" (1 626 m)        Physical Exam  Constitutional:       Appearance: Normal appearance  HENT:      Head: Normocephalic and atraumatic  Cardiovascular:      Rate and Rhythm: Normal rate and regular rhythm  Pulses: Normal pulses  Heart sounds: Normal heart sounds  Pulmonary:      Effort: Pulmonary effort is normal       Breath sounds: Normal breath sounds  Musculoskeletal:         General: No swelling, tenderness, deformity or signs of injury  Normal range of motion        Comments: Left lateral epicondyle tender to palpation, full ROM, pain increase with supination and flex/ext wrist Neurological:      General: No focal deficit present  Mental Status: She is alert and oriented to person, place, and time  Psychiatric:         Mood and Affect: Mood normal          Behavior: Behavior normal          Thought Content:  Thought content normal          Judgment: Judgment normal

## 2022-06-22 ENCOUNTER — EVALUATION (OUTPATIENT)
Dept: PHYSICAL THERAPY | Facility: CLINIC | Age: 52
End: 2022-06-22
Payer: COMMERCIAL

## 2022-06-22 DIAGNOSIS — G89.29 CHRONIC PAIN OF RIGHT KNEE: ICD-10-CM

## 2022-06-22 DIAGNOSIS — M25.561 CHRONIC PAIN OF RIGHT KNEE: ICD-10-CM

## 2022-06-22 DIAGNOSIS — M77.12 LATERAL EPICONDYLITIS, LEFT ELBOW: Primary | ICD-10-CM

## 2022-06-22 PROCEDURE — 97140 MANUAL THERAPY 1/> REGIONS: CPT | Performed by: PHYSICAL THERAPIST

## 2022-06-22 PROCEDURE — 97110 THERAPEUTIC EXERCISES: CPT | Performed by: PHYSICAL THERAPIST

## 2022-06-22 PROCEDURE — 97161 PT EVAL LOW COMPLEX 20 MIN: CPT | Performed by: PHYSICAL THERAPIST

## 2022-06-22 NOTE — PROGRESS NOTES
PT Evaluation     Today's date: 2022  Patient name: Korina Trejo  : 1970  MRN: [id number]  Referring provider: Juan Rodriguez PA-C  Dx:   Encounter Diagnosis     ICD-10-CM    1  Lateral epicondylitis, left elbow  M77 [id number] Ambulatory Referral to Physical Therapy   2  Chronic pain of right knee  M25 561 Ambulatory Referral to Physical Therapy    G89 [id number]                   Assessment  Assessment details: Pt is 52yo female presenting with subacute elbow pain and chronic knee pain  Elbow symptoms seem to be consistent with lateral epicondylitis, with (+) Mill's test and pain with resisted wrist extension and supination  Knee pain is consistent with patellofemoral syndrome, pain along the medial border of patella with active knee extension  Pt would benefit from PT services to improve the impairments to return to PLOF  Impairments: abnormal or restricted ROM, activity intolerance, impaired physical strength, lacks appropriate home exercise program and pain with function  Functional limitations: standing; stairs  Symptom irritability: lowUnderstanding of Dx/Px/POC: good   Prognosis: good    Goals  1  Pt will have pain free wrist extension strength for pain free gripping  2  Pt will be able to open jars without pain  3  Pt will be able to descend steps without pain  4  Pt will be able to actively extend knee without patellar pain  5  Pt will be independent with HEP upon discharge      Plan  Patient would benefit from: skilled physical therapy  Planned modality interventions: low level laser therapy  Planned therapy interventions: functional ROM exercises, therapeutic activities, therapeutic exercise, therapeutic training, stretching, strengthening, home exercise program, neuromuscular re-education, manual therapy and patient education  Frequency: 2x week  Duration in weeks: 6  Treatment plan discussed with: patient        Subjective Evaluation    History of Present Illness  Mechanism of injury: Pt on May 9 participating in a nailing competition  So her Lt elbow seem weak following in the forearm  She is Lt handed  Her Rt knee started  Achy following having COVID in May  She was walking up steps  Standing after sitting, and evening is worse  Quality of life: good    Pain  Current pain ratin  At worst pain ratin  Location: Lt forearm; Rt knee patellar  Quality: dull ache  Relieving factors: rest  Aggravating factors: sitting  Progression: improved    Hand dominance: left  Exercise history: walks the dog    Patient Goals  Patient goals for therapy: independence with ADLs/IADLs, return to sport/leisure activities, decreased pain and increased strength          Objective     Tenderness     Right Knee   Tenderness in the medial patella  Active Range of Motion     Left Elbow   Normal active range of motion    Right Elbow   Normal active range of motion  Left Knee   Normal active range of motion    Right Knee   Normal active range of motion    Additional Active Range of Motion Details  Pain on extension actively    Mobility   Patellar Mobility:     Right Knee   Hypomobile: medial, superior and inferior     Patellar Static Positioning   Left Knee: WFL  Right Knee: [place]    Strength/Myotome Testing     Left Elbow   Flexion: 5  Extension: 5  Forearm supination: 4  Forearm pronation: 5    Left Knee   Flexion: 5  Extension: 5    Right Knee   Flexion: 5  Extension: 5             Precautions: none      Manuals             Laser elbow -itis protocol FH            Patellar mobs FH            KT tape for patella FH            EPAT?              Neuro Re-Ed             SL RDL             Cone Taps             Standing Hip 3 ways band            Posture t-band                                                    Ther Ex             Wrist Ext stretch HEP            Wrist Ext Ecc HEP            Sup/Pro resisted             Wrist Ext flex bar             Gripping             Bridges HEP            Clamshells HEP Reverse Giovanny HEP                                                   Ther Activity             Lateral Heel Downs                          Gait Training                                       Modalities

## 2022-06-24 ENCOUNTER — OFFICE VISIT (OUTPATIENT)
Dept: PHYSICAL THERAPY | Facility: CLINIC | Age: 52
End: 2022-06-24
Payer: COMMERCIAL

## 2022-06-24 DIAGNOSIS — M77.12 LATERAL EPICONDYLITIS, LEFT ELBOW: Primary | ICD-10-CM

## 2022-06-24 DIAGNOSIS — M25.561 CHRONIC PAIN OF RIGHT KNEE: ICD-10-CM

## 2022-06-24 DIAGNOSIS — G89.29 CHRONIC PAIN OF RIGHT KNEE: ICD-10-CM

## 2022-06-24 PROCEDURE — 97140 MANUAL THERAPY 1/> REGIONS: CPT | Performed by: PHYSICAL THERAPIST

## 2022-06-24 PROCEDURE — 97112 NEUROMUSCULAR REEDUCATION: CPT | Performed by: PHYSICAL THERAPIST

## 2022-06-24 PROCEDURE — 97110 THERAPEUTIC EXERCISES: CPT | Performed by: PHYSICAL THERAPIST

## 2022-06-24 NOTE — PROGRESS NOTES
Daily Note     Today's date: 2022  Patient name: Darrius Mcmanus  : 1970  MRN: [id number]  Referring provider: Caro Quintanilla PA-C  Dx:   Encounter Diagnosis     ICD-10-CM    1  Lateral epicondylitis, left elbow  M77 12    2  Chronic pain of right knee  M25 561     G89 29                   Subjective: Pt reports improvement with KT tape on knee  Exercises are challenging but good  Objective: See treatment diary below      Assessment: Pt tolerated treatment well  Lateral heel downs were most challenging for the knee but not painful  Elbow exercises were sore but tolerated well  Plan: Continue per plan of care        Precautions: none      Manuals            Laser elbow -itis protocol FH FH           Patellar mobs FH            KT tape for patella FH FH           EPAT to elbow  FH R15 3 3barr 15Hz           Neuro Re-Ed             SL RDL  15xea big cone           Cone Taps  15xea           Standing Hip ABD and EXT band 15xea green           Posture t-band  2x10ea blue                                                  Ther Ex             Wrist Ext stretch HEP 2x30''           Wrist Ext Ecc HEP            Sup/Pro resisted             Wrist Ext flex bar  30x red           Gripping  25x           Bridges HEP            Clamshells HEP            Reverse Clamshells HEP            Lateral Heel Downs  2x10 4''                                     Ther Activity                                       Gait Training                                       Modalities

## 2022-06-27 ENCOUNTER — OFFICE VISIT (OUTPATIENT)
Dept: PHYSICAL THERAPY | Facility: CLINIC | Age: 52
End: 2022-06-27
Payer: COMMERCIAL

## 2022-06-27 DIAGNOSIS — M77.12 LATERAL EPICONDYLITIS, LEFT ELBOW: Primary | ICD-10-CM

## 2022-06-27 DIAGNOSIS — G89.29 CHRONIC PAIN OF RIGHT KNEE: ICD-10-CM

## 2022-06-27 DIAGNOSIS — M25.561 CHRONIC PAIN OF RIGHT KNEE: ICD-10-CM

## 2022-06-27 PROCEDURE — 97112 NEUROMUSCULAR REEDUCATION: CPT | Performed by: PHYSICAL THERAPIST

## 2022-06-27 PROCEDURE — 97110 THERAPEUTIC EXERCISES: CPT | Performed by: PHYSICAL THERAPIST

## 2022-06-27 PROCEDURE — 97140 MANUAL THERAPY 1/> REGIONS: CPT | Performed by: PHYSICAL THERAPIST

## 2022-06-27 NOTE — PROGRESS NOTES
Daily Note     Today's date: 2022  Patient name: Cierra Painting  : 1970  MRN: [id number]  Referring provider: Rich Lomeli PA-C  Dx:   Encounter Diagnosis     ICD-10-CM    1  Lateral epicondylitis, left elbow  M77 12    2  Chronic pain of right knee  M25 561     G89 29                   Subjective: Pt reports minor soreness after last visit  Objective: See treatment diary below      Assessment: Pt had good tolerance to all activities  No pain reported t/o session  Plan: Continue per plan of care        Precautions: none      Manuals           Laser elbow -itis protocol [facility] MD          Patellar mobs             KT tape for patella [facility] MD          EPAT to elbow   R15 3 3barr 15Hz           Neuro Re-Ed             SL RDL  15xea big cone 20x 10 ea big cone          Cone Taps  15xea 15x ea          Standing Hip ABD and EXT band 15xea green 2x10 ea green          Posture t-band  2x10ea blue 2x10 ea blue                                                 Ther Ex             Wrist Ext stretch HEP 2x30'' 2x30"          Wrist Ext Ecc HEP            Sup/Pro resisted             Wrist Ext flex bar  30x red 20x red          Gripping  25x 25x          Bridges HEP            Clamshells HEP            Reverse Clamshells HEP            Lateral Heel Downs  2x10 4'' 2x10 4"                                    Ther Activity                                       Gait Training                                       Modalities

## 2022-06-29 ENCOUNTER — OFFICE VISIT (OUTPATIENT)
Dept: PHYSICAL THERAPY | Facility: CLINIC | Age: 52
End: 2022-06-29
Payer: COMMERCIAL

## 2022-06-29 DIAGNOSIS — M25.561 CHRONIC PAIN OF RIGHT KNEE: ICD-10-CM

## 2022-06-29 DIAGNOSIS — G89.29 CHRONIC PAIN OF RIGHT KNEE: ICD-10-CM

## 2022-06-29 DIAGNOSIS — M77.12 LATERAL EPICONDYLITIS, LEFT ELBOW: Primary | ICD-10-CM

## 2022-06-29 PROCEDURE — 97140 MANUAL THERAPY 1/> REGIONS: CPT | Performed by: PHYSICAL THERAPIST

## 2022-06-29 PROCEDURE — 97112 NEUROMUSCULAR REEDUCATION: CPT | Performed by: PHYSICAL THERAPIST

## 2022-06-29 PROCEDURE — 97110 THERAPEUTIC EXERCISES: CPT | Performed by: PHYSICAL THERAPIST

## 2022-06-29 NOTE — PROGRESS NOTES
Daily Note     Today's date: 2022  Patient name: Aarti Chang  : 1970  MRN: [id number]  Referring provider: Frankey Azure, PA-C  Dx:   Encounter Diagnosis     ICD-10-CM    1  Lateral epicondylitis, left elbow  M77 12    2  Chronic pain of right knee  M25 561     G89 29                   Subjective: Pt reports she hasn't felt much change, slightly frustrated  Objective: See treatment diary below      Assessment: Tolerated treatment well with manual  Load tolerance is still limited for knee and elbow with POC  Educated about loading tendon and progressing as able  Plan: Continue per plan of care        Precautions: none      Manuals          Laser elbow -itis protocol [facility] MD          Patellar mobs             KT tape for patella [facility] MD FH         EPAT to elbow  FH R15 3 3barr 15Hz  FH R15 3 3 barr 15Hz         IASTM to elbow    FH         Neuro Re-Ed             SL RDL  15xea big cone 20x 10 ea big cone 20xea big cone         Cone Taps  15xea 15x ea 20xea         Standing Hip ABD and EXT band 15xea green 2x10 ea green 20xea green         Posture t-band  2x10ea blue 2x10 ea blue 2x10 ea black                                                Ther Ex             Wrist Ext stretch HEP 2x30'' 2x30" 2x30''         Wrist Ext Ecc HEP            Sup/Pro resisted    30x 3#         Wrist Ext flex bar  30x red 20x red 30x red         Gripping  25x 25x 25x         Bridges HEP            Clamshells HEP            Reverse Clamshells HEP            Lateral Heel Downs  2x10 4'' 2x10 4" 30x 4''                                   Ther Activity                                       Gait Training                                       Modalities

## 2022-07-01 ENCOUNTER — APPOINTMENT (OUTPATIENT)
Dept: PHYSICAL THERAPY | Facility: CLINIC | Age: 52
End: 2022-07-01
Payer: COMMERCIAL

## 2022-07-05 ENCOUNTER — APPOINTMENT (OUTPATIENT)
Dept: PHYSICAL THERAPY | Facility: CLINIC | Age: 52
End: 2022-07-05
Payer: COMMERCIAL

## 2022-07-07 ENCOUNTER — OFFICE VISIT (OUTPATIENT)
Dept: PHYSICAL THERAPY | Facility: CLINIC | Age: 52
End: 2022-07-07
Payer: COMMERCIAL

## 2022-07-07 DIAGNOSIS — G89.29 CHRONIC PAIN OF RIGHT KNEE: ICD-10-CM

## 2022-07-07 DIAGNOSIS — M25.561 CHRONIC PAIN OF RIGHT KNEE: ICD-10-CM

## 2022-07-07 DIAGNOSIS — M77.12 LATERAL EPICONDYLITIS, LEFT ELBOW: Primary | ICD-10-CM

## 2022-07-07 PROCEDURE — 97112 NEUROMUSCULAR REEDUCATION: CPT | Performed by: PHYSICAL THERAPIST

## 2022-07-07 PROCEDURE — 97110 THERAPEUTIC EXERCISES: CPT | Performed by: PHYSICAL THERAPIST

## 2022-07-07 PROCEDURE — 97140 MANUAL THERAPY 1/> REGIONS: CPT | Performed by: PHYSICAL THERAPIST

## 2022-07-07 NOTE — PROGRESS NOTES
Daily Note     Today's date: 2022  Patient name: Abram Mckeon  : 1970  MRN: [id number]  Referring provider: Judd Webb PA-C  Dx:   Encounter Diagnosis     ICD-10-CM    1  Lateral epicondylitis, left elbow  M77 12    2  Chronic pain of right knee  M25 561     G89 29                   Subjective: Pt reports elbow is improving, exercises are still sore, knee is killing her though  Objective: See treatment diary below      Assessment: Pt tolerating manual for elbow better with less tenderness  Added increased load to quad that was tolerated fairly well but did limit rom for painfree range  Added those to HEP  Pt tolerated elbow strengthening exercises well today  Plan: Continue per plan of care        Precautions: none      Manuals         Laser elbow -itis protocol [facility] MD          Patellar mobs             KT tape for patella [facility] MD [facility]        EPAT to elbow   R15 3 3barr 15Hz  FH R15 3 3 barr 15Hz  R15 4 1 barr 15Hz        IASTM to elbow    [facility]        Neuro Re-Ed             SL RDL  15xea big cone 20x 10 ea big cone 20xea big cone 20xea big cone        Cone Taps  15xea 15x ea 20xea 20xea        Standing Hip ABD and EXT band 15xea green 2x10 ea green 20xea green 20xea green        Posture t-band  2x10ea blue 2x10 ea blue 2x10 ea black 2x10 ea black                                               Ther Ex             Wrist Ext stretch HEP 2x30'' 2x30" 2x30'' HEP        Wrist Ext Ecc HEP            Sup/Pro resisted    30x 3# HEP        Wrist Ext flex bar  30x red 20x red 30x red 30x red        Gripping  25x 25x 25x 30x        Bridges HEP            Clamshells HEP            Reverse Clamshells HEP            Lateral Heel Downs  2x10 4'' 2x10 4" 30x 4'' 30x 6''        Ecc Leg Ext     20x 20#                     Ther Activity                                       Gait Training                                       Modalities

## 2022-07-11 ENCOUNTER — OFFICE VISIT (OUTPATIENT)
Dept: PHYSICAL THERAPY | Facility: CLINIC | Age: 52
End: 2022-07-11
Payer: COMMERCIAL

## 2022-07-11 DIAGNOSIS — M25.561 CHRONIC PAIN OF RIGHT KNEE: ICD-10-CM

## 2022-07-11 DIAGNOSIS — G89.29 CHRONIC PAIN OF RIGHT KNEE: ICD-10-CM

## 2022-07-11 DIAGNOSIS — M77.12 LATERAL EPICONDYLITIS, LEFT ELBOW: Primary | ICD-10-CM

## 2022-07-11 PROCEDURE — 97112 NEUROMUSCULAR REEDUCATION: CPT | Performed by: PHYSICAL THERAPIST

## 2022-07-11 PROCEDURE — 97110 THERAPEUTIC EXERCISES: CPT | Performed by: PHYSICAL THERAPIST

## 2022-07-11 PROCEDURE — 97140 MANUAL THERAPY 1/> REGIONS: CPT | Performed by: PHYSICAL THERAPIST

## 2022-07-11 NOTE — PROGRESS NOTES
Daily Note     Today's date: 2022  Patient name: Rosibel Connors  : 1970  MRN: [id number]  Referring provider: Pheobe Apgar, PA-C  Dx:   Encounter Diagnosis     ICD-10-CM    1  Lateral epicondylitis, left elbow  M77 12    2  Chronic pain of right knee  M25 561     G89 29                   Subjective: Pt reports knee doing really well this weekend with biking, but her elbow was bothering her after biking due to the gripping  Objective: See treatment diary below      Assessment: Pt is progressing well with less tenderness along wrist extensors  Pt also having no discomfort with knee and quad strengthening exercises today  Will continue to progress gripping exercises  Plan: Continue per plan of care        Precautions: none      Manuals        Laser elbow -itis protocol [facility] MD          Patellar mobs             KT tape for patella [facility] MD  FH FH       EPAT to elbow  FH R15 3 3barr 15Hz  FH R15 3 3 barr 15Hz FH R15 4 1 barr 15Hz        IASTM to elbow    [facility] FH       Neuro Re-Ed             SL RDL  15xea big cone 20x 10 ea big cone 20xea big cone 20xea big cone 30xea big cone       Cone Taps  15xea 15x ea 20xea 20xea 30xea       Standing Hip ABD and EXT band 15xea green 2x10 ea green 20xea green 20xea green 20xea green       Posture t-band  2x10ea blue 2x10 ea blue 2x10 ea black 2x10 ea black 2x15ea black       Chops single arm      2x15 blue       Lifts single arm                          Ther Ex             Wrist Ext stretch HEP 2x30'' 2x30" 2x30'' HEP        Wrist Ext Ecc HEP            Sup/Pro resisted    30x 3# HEP        Wrist Ext flex bar  30x red 20x red 30x red 30x red 30x red       Gripping  25x 25x 25x 30x 30x       Carries             Bridges HEP            Clamshells HEP            Reverse Clamshells HEP            Lateral Heel Downs  2x10 4'' 2x10 4" 30x 4'' 30x 6'' 30x 6''       Ecc Leg Ext     20x 20# 20x 20#                    Ther Activity Gait Training                                       Modalities

## 2022-07-13 ENCOUNTER — OFFICE VISIT (OUTPATIENT)
Dept: PHYSICAL THERAPY | Facility: CLINIC | Age: 52
End: 2022-07-13
Payer: COMMERCIAL

## 2022-07-13 DIAGNOSIS — M25.561 CHRONIC PAIN OF RIGHT KNEE: ICD-10-CM

## 2022-07-13 DIAGNOSIS — G89.29 CHRONIC PAIN OF RIGHT KNEE: ICD-10-CM

## 2022-07-13 DIAGNOSIS — M77.12 LATERAL EPICONDYLITIS, LEFT ELBOW: Primary | ICD-10-CM

## 2022-07-13 PROCEDURE — 97140 MANUAL THERAPY 1/> REGIONS: CPT | Performed by: PHYSICAL THERAPIST

## 2022-07-13 PROCEDURE — 97112 NEUROMUSCULAR REEDUCATION: CPT | Performed by: PHYSICAL THERAPIST

## 2022-07-13 PROCEDURE — 97110 THERAPEUTIC EXERCISES: CPT | Performed by: PHYSICAL THERAPIST

## 2022-07-13 NOTE — PROGRESS NOTES
Daily Note     Today's date: 2022  Patient name: Thiago Ross  : 1970  MRN: [id number]  Referring provider: Cecile Deleon PA-C  Dx:   Encounter Diagnosis     ICD-10-CM    1  Lateral epicondylitis, left elbow  M77 12    2  Chronic pain of right knee  M25 561     G89 29                   Subjective: Pt reports elbow is feeling pretty good, her knee is bothering her more today  Objective: See treatment diary below      Assessment: Pt tolerating exercises well as well as progressions  Had more shoulder fatigue than elbow with added exercises  Will continue to progress as able  Plan: Continue per plan of care        Precautions: none      Manuals       Laser elbow -itis protocol  FH MD          Patellar mobs FH            KT tape for patella  FH MD FH FH FH FH      EPAT to elbow  FH R15 3 3barr 15Hz  FH R15 3 3 barr 15Hz FH R15 4 1 barr 15Hz  FH R15 4 1barr 15Hz      IASTM to elbow    FH FH FH FH      Neuro Re-Ed             SL RDL  15xea big cone 20x 10 ea big cone 20xea big cone 20xea big cone 30xea big cone 30xea big cone      Cone Taps  15xea 15x ea 20xea 20xea 30xea 30x ea      Standing Hip ABD and EXT band 15xea green 2x10 ea green 20xea green 20xea green 20xea green 20xea blue      Posture t-band  2x10ea blue 2x10 ea blue 2x10 ea black 2x10 ea black 2x15ea black 2x15ea black      Chops single arm      2x15 blue 2x15 blue      Lifts single arm       2x15 blue                   Ther Ex             Wrist Ext stretch HEP 2x30'' 2x30" 2x30'' HEP        Wrist Ext Ecc HEP            Sup/Pro resisted    30x 3# HEP        Wrist Ext flex bar  30x red 20x red 30x red 30x red 30x red 30x red      Gripping  25x 25x 25x 30x 30x 30x      3way raise       10x ea 3#      Bridges HEP            Clamshells HEP            Reverse Clamshells HEP            Lateral Heel Downs  2x10 4'' 2x10 4" 30x 4'' 30x 6'' 30x 6'' 30x 6"      Ecc Leg Ext     20x 20# 20x 20# 20x 20# Split stance squat       10xea      Ther Activity                                       Gait Training                                       Modalities

## 2022-07-18 ENCOUNTER — APPOINTMENT (OUTPATIENT)
Dept: PHYSICAL THERAPY | Facility: CLINIC | Age: 52
End: 2022-07-18
Payer: COMMERCIAL

## 2022-07-19 ENCOUNTER — OFFICE VISIT (OUTPATIENT)
Dept: PHYSICAL THERAPY | Facility: CLINIC | Age: 52
End: 2022-07-19
Payer: COMMERCIAL

## 2022-07-19 DIAGNOSIS — M25.561 CHRONIC PAIN OF RIGHT KNEE: ICD-10-CM

## 2022-07-19 DIAGNOSIS — M77.12 LATERAL EPICONDYLITIS, LEFT ELBOW: Primary | ICD-10-CM

## 2022-07-19 DIAGNOSIS — G89.29 CHRONIC PAIN OF RIGHT KNEE: ICD-10-CM

## 2022-07-19 PROCEDURE — 97110 THERAPEUTIC EXERCISES: CPT | Performed by: PHYSICAL THERAPIST

## 2022-07-19 PROCEDURE — 97140 MANUAL THERAPY 1/> REGIONS: CPT | Performed by: PHYSICAL THERAPIST

## 2022-07-19 PROCEDURE — 97112 NEUROMUSCULAR REEDUCATION: CPT | Performed by: PHYSICAL THERAPIST

## 2022-07-19 NOTE — PROGRESS NOTES
Daily Note     Today's date: 2022  Patient name: Kai Amezquita  : 1970  MRN: [id number]  Referring provider: Candelaria Dillon PA-C  Dx:   Encounter Diagnosis     ICD-10-CM    1  Lateral epicondylitis, left elbow  M77 12    2  Chronic pain of right knee  M25 561     G89 29                   Subjective: Pt reports walking a lot over the weekend and her knee feeling pretty good  Elbow is also feeling good overall  Objective: See treatment diary below      Assessment: Pt is tolerating treatment very well with progressions in resistance and weight  Will continue to progress toward discharge of HEP within coming weeks  Plan: Continue per plan of care        Precautions: none      Manuals      Laser elbow -itis protocol [facility] MD          Patellar mobs             KT tape for patella [facility] MD FH [facility] FH     EPAT to elbow   R15 3 3barr 15Hz  FH R15 3 3 barr 15Hz FH R15 4 1 barr 15Hz  FH R15 4 1barr 15Hz  last    IASTM to elbow    [facility] FH     Neuro Re-Ed             SL RDL  15xea big cone 20x 10 ea big cone 20xea big cone 20xea big cone 30xea big cone 30xea big cone 30xea big cone foam     Cone Taps  15xea 15x ea 20xea 20xea 30xea 30x ea 30xea foam     Standing Hip ABD and EXT band 15xea green 2x10 ea green 20xea green 20xea green 20xea green 20xea blue 20xea green     Posture t-band  2x10ea blue 2x10 ea blue 2x10 ea black 2x10 ea black 2x15ea black 2x15ea black 2x15ea black     Chops single arm      2x15 blue 2x15 blue 2x15 black     Lifts single arm       2x15 blue 2x15 black                  Ther Ex             Wrist Ext stretch HEP 2x30'' 2x30" 2x30'' HEP        Wrist Ext Ecc HEP            Sup/Pro resisted    30x 3# HEP        Wrist Ext flex bar  30x red 20x red 30x red 30x red 30x red 30x red 30x red     Gripping  25x 25x 25x 30x 30x 30x 30x     3way raise       10x ea 3# 15xea 3#     Bridges HEP            Clamshells HEP            Reverse Clamshells HEP            Lateral Heel Downs  2x10 4'' 2x10 4" 30x 4'' 30x 6'' 30x 6'' 30x 6" 30x 6''     Ecc Leg Ext     20x 20# 20x 20# 20x 20# 20x 20#     Split stance squat       10xea  4'' foam 10x ea 4'' foam     Ther Activity                                       Gait Training                                       Modalities

## 2022-07-21 ENCOUNTER — OFFICE VISIT (OUTPATIENT)
Dept: PHYSICAL THERAPY | Facility: CLINIC | Age: 52
End: 2022-07-21
Payer: COMMERCIAL

## 2022-07-21 DIAGNOSIS — M77.12 LATERAL EPICONDYLITIS, LEFT ELBOW: Primary | ICD-10-CM

## 2022-07-21 DIAGNOSIS — M25.561 CHRONIC PAIN OF RIGHT KNEE: ICD-10-CM

## 2022-07-21 DIAGNOSIS — G89.29 CHRONIC PAIN OF RIGHT KNEE: ICD-10-CM

## 2022-07-21 PROCEDURE — 97140 MANUAL THERAPY 1/> REGIONS: CPT | Performed by: PHYSICAL THERAPIST

## 2022-07-21 PROCEDURE — 97110 THERAPEUTIC EXERCISES: CPT | Performed by: PHYSICAL THERAPIST

## 2022-07-21 PROCEDURE — 97112 NEUROMUSCULAR REEDUCATION: CPT | Performed by: PHYSICAL THERAPIST

## 2022-07-21 NOTE — PROGRESS NOTES
Daily Note     Today's date: 2022  Patient name: Dante Corea  : 1970  MRN: [id number]  Referring provider: Reg Small PA-C  Dx:   Encounter Diagnosis     ICD-10-CM    1  Lateral epicondylitis, left elbow  M77 12    2  Chronic pain of right knee  M25 561     G89 29                   Subjective: Pt reports still doing well both knee and elbow  Objective: See treatment diary below      Assessment: Pt tolerating manual well with minimal to no tenderness  Strengthening exercises are still fatiguing but feeling close to equal of other side  Possible discharge end of next week  Plan: Continue per plan of care        Precautions: none      Manuals     Laser elbow -itis protocol  FH MD          Patellar mobs FH            KT tape for patella  FH MD FH FH FH FH FH FH    EPAT to elbow  FH R15 3 3barr 15Hz  FH R15 3 3 barr 15Hz FH R15 4 1 barr 15Hz  FH R15 4 1barr 15Hz  FH R15 4 1barr 15Hz    IASTM to elbow    FH FH FH FH FH FH    Neuro Re-Ed             SL RDL  15xea big cone 20x 10 ea big cone 20xea big cone 20xea big cone 30xea big cone 30xea big cone 30xea big cone foam 30xea big cone foam    Cone Taps  15xea 15x ea 20xea 20xea 30xea 30x ea 30xea foam 30xea foam    Standing Hip ABD and EXT band 15xea green 2x10 ea green 20xea green 20xea green 20xea green 20xea blue 20xea green 20x ea green    Posture t-band  2x10ea blue 2x10 ea blue 2x10 ea black 2x10 ea black 2x15ea black 2x15ea black 2x15ea black 2x15 ea black    Chops single arm      2x15 blue 2x15 blue 2x15 black 2x15 black    Lifts single arm       2x15 blue 2x15 black 2x15 black                 Ther Ex             Wrist Ext stretch HEP 2x30'' 2x30" 2x30'' HEP        Wrist Ext Ecc HEP            Sup/Pro resisted    30x 3# HEP        Wrist Ext flex bar  30x red 20x red 30x red 30x red 30x red 30x red 30x red 30x red    Gripping  25x 25x 25x 30x 30x 30x 30x 30x5''    3way raise       10x ea 3# 15xea 3# 20xea 3#    Bridges HEP            Clamshells HEP            Reverse Clamshells HEP            Lateral Heel Downs  2x10 4'' 2x10 4" 30x 4'' 30x 6'' 30x 6'' 30x 6" 30x 6'' 30x 6''    Ecc Leg Ext     20x 20# 20x 20# 20x 20# 20x 20# 20x 20#    Split stance squat       10xea  4'' foam 10x ea 4'' foam 10x ea 4'' foam    Ther Activity                                       Gait Training                                       Modalities

## 2022-07-25 ENCOUNTER — OFFICE VISIT (OUTPATIENT)
Dept: PHYSICAL THERAPY | Facility: CLINIC | Age: 52
End: 2022-07-25
Payer: COMMERCIAL

## 2022-07-25 DIAGNOSIS — M77.12 LATERAL EPICONDYLITIS, LEFT ELBOW: Primary | ICD-10-CM

## 2022-07-25 DIAGNOSIS — M25.561 CHRONIC PAIN OF RIGHT KNEE: ICD-10-CM

## 2022-07-25 DIAGNOSIS — G89.29 CHRONIC PAIN OF RIGHT KNEE: ICD-10-CM

## 2022-07-25 PROCEDURE — 97140 MANUAL THERAPY 1/> REGIONS: CPT | Performed by: PHYSICAL THERAPIST

## 2022-07-25 PROCEDURE — 97112 NEUROMUSCULAR REEDUCATION: CPT | Performed by: PHYSICAL THERAPIST

## 2022-07-25 PROCEDURE — 97110 THERAPEUTIC EXERCISES: CPT | Performed by: PHYSICAL THERAPIST

## 2022-07-25 NOTE — PROGRESS NOTES
Daily Note     Today's date: 2022  Patient name: Sukhwinder Elizabeth  : 1970  MRN: [id number]  Referring provider: Mecca Aguilera PA-C  Dx:   Encounter Diagnosis     ICD-10-CM    1  Lateral epicondylitis, left elbow  M77 12    2  Chronic pain of right knee  M25 561     G89 29                   Subjective: Pt reports knee and elbow are doing well  Ready for discharge next visit  Objective: See treatment diary below      Assessment: Pt tolerating all exercises very well, just fatigue reported with fatigue  No tenderness noted with manual therapy  Pt ready for discharge to [facility] next session  Plan: Potential discharge next visit       Precautions: none      Manuals     Laser elbow -itis protocol   MD          Patellar mobs             KT tape for patella  FH MD FH FH FH FH FH FH FH   EPAT to elbow  FH R15 3 3barr 15Hz  FH R15 3 3 barr 15Hz FH R15 4 1 barr 15Hz  FH R15 4 1barr 15Hz  FH R15 4 1barr 15Hz D/C   IASTM to elbow    FH FH FH FH FH FH FH   Neuro Re-Ed             SL RDL  15xea big cone 20x 10 ea big cone 20xea big cone 20xea big cone 30xea big cone 30xea big cone 30xea big cone foam 30xea big cone foam 30xea big cone foam   Cone Taps  15xea 15x ea 20xea 20xea 30xea 30x ea 30xea foam 30xea foam 30xea foam   Standing Hip ABD and EXT  15xea green 2x10 ea green 20xea green 20xea green 20xea green 20xea blue 20xea green 20x ea green 20xea green   Posture t-band  2x10ea blue 2x10 ea blue 2x10 ea black 2x10 ea black 2x15ea black 2x15ea black 2x15ea black 2x15 ea black 2x15 ea black   Chops single arm      2x15 blue 2x15 blue 2x15 black 2x15 black 2x15 black   Lifts single arm       2x15 blue 2x15 black 2x15 black 2x15 black                Ther Ex             Wrist Ext stretch  2x30'' 2x30" 2x30'' HEP        Wrist Ext Ecc             Sup/Pro resisted    30x 3# HEP        Wrist Ext flex bar  30x red 20x red 30x red 30x red 30x red 30x red 30x red 30x red 30x red   Gripping  25x 25x 25x 30x 30x 30x 30x 30x5'' 30x5''   3way raise       10x ea 3# 15xea 3# 20xea 3# 20xea 3#   Bridges             Clamshells             Reverse Clamshells             Lateral Heel Downs  2x10 4'' 2x10 4" 30x 4'' 30x 6'' 30x 6'' 30x 6" 30x 6'' 30x 6'' 30x 6''   Ecc Leg Ext     20x 20# 20x 20# 20x 20# 20x 20# 20x 20# 20x 20#   Split stance squat       10xea  4'' foam 10x ea 4'' foam 10x ea 4'' foam 10xea 4'' foam   Ther Activity                                       Gait Training                                       Modalities

## 2022-07-27 NOTE — PROGRESS NOTES
Pt missed her last appointment, spoke on the phone and is ready for discharge to [facility]  Band were cut for her to  for [facility]  Discharge PT services

## 2022-08-24 DIAGNOSIS — Z30.41 ORAL CONTRACEPTIVE USE: ICD-10-CM

## 2022-08-24 RX ORDER — NORGESTREL AND ETHINYL ESTRADIOL 0.3-0.03MG
1 KIT ORAL DAILY
Qty: 84 TABLET | Refills: 3 | Status: SHIPPED | OUTPATIENT
Start: 2022-08-24

## 2022-10-16 ENCOUNTER — HOSPITAL ENCOUNTER (OUTPATIENT)
Dept: MAMMOGRAPHY | Facility: IMAGING CENTER | Age: 52
Discharge: HOME | End: 2022-10-16
Payer: COMMERCIAL

## 2022-10-16 VITALS — WEIGHT: 222.66 LBS | BODY MASS INDEX: 38.01 KG/M2 | HEIGHT: 64 IN

## 2022-10-16 DIAGNOSIS — Z12.31 ENCOUNTER FOR SCREENING MAMMOGRAM FOR MALIGNANT NEOPLASM OF BREAST: ICD-10-CM

## 2022-10-16 DIAGNOSIS — Z12.31 VISIT FOR SCREENING MAMMOGRAM: ICD-10-CM

## 2022-10-16 PROCEDURE — 77063 BREAST TOMOSYNTHESIS BI: CPT

## 2022-10-16 PROCEDURE — 77067 SCR MAMMO BI INCL CAD: CPT

## 2023-05-01 ENCOUNTER — OFFICE VISIT (OUTPATIENT)
Dept: FAMILY MEDICINE CLINIC | Facility: CLINIC | Age: 53
End: 2023-05-01

## 2023-05-01 VITALS
HEART RATE: 100 BPM | DIASTOLIC BLOOD PRESSURE: 86 MMHG | BODY MASS INDEX: 39.09 KG/M2 | SYSTOLIC BLOOD PRESSURE: 142 MMHG | OXYGEN SATURATION: 97 % | WEIGHT: 229 LBS | RESPIRATION RATE: 18 BRPM | HEIGHT: 64 IN | TEMPERATURE: 97.6 F

## 2023-05-01 DIAGNOSIS — R07.89 CHEST TIGHTNESS: ICD-10-CM

## 2023-05-01 DIAGNOSIS — R03.0 ELEVATED BP WITHOUT DIAGNOSIS OF HYPERTENSION: ICD-10-CM

## 2023-05-01 DIAGNOSIS — J01.90 ACUTE NON-RECURRENT SINUSITIS, UNSPECIFIED LOCATION: Primary | ICD-10-CM

## 2023-05-01 RX ORDER — PREDNISONE 10 MG/1
TABLET ORAL
Qty: 20 TABLET | Refills: 0 | Status: SHIPPED | OUTPATIENT
Start: 2023-05-01

## 2023-05-01 RX ORDER — ALBUTEROL SULFATE 90 UG/1
2 AEROSOL, METERED RESPIRATORY (INHALATION) EVERY 6 HOURS PRN
Qty: 8 G | Refills: 0 | Status: SHIPPED | OUTPATIENT
Start: 2023-05-01

## 2023-05-01 RX ORDER — CEFUROXIME AXETIL 500 MG/1
500 TABLET ORAL 2 TIMES DAILY WITH MEALS
Qty: 20 TABLET | Refills: 0 | Status: SHIPPED | OUTPATIENT
Start: 2023-05-01 | End: 2023-05-11

## 2023-05-01 NOTE — PROGRESS NOTES
Assessment/Plan:    Acute sinusitis  - continue Flonase, start prednisone taper and cefuroxime, follow up for persistent or worsening symptoms  - predniSONE 10 mg tablet; Take 4 tablets daily with food for 2 days, 3 tablets daily for 2 days, 2 tablets daily for 2 days, 1 tablet daily for 2 days  Dispense: 20 tablet; Refill: 0  - cefuroxime (CEFTIN) 500 mg tablet; Take 1 tablet (500 mg total) by mouth 2 (two) times a day with meals for 10 days  Dispense: 20 tablet; Refill: 0    Chest tightness  - start prednisone and use Albuterol inhaler as needed  - albuterol (PROVENTIL HFA,VENTOLIN HFA) 90 mcg/act inhaler; Inhale 2 puffs every 6 (six) hours as needed for wheezing or shortness of breath  Dispense: 8 g; Refill: 0  - predniSONE 10 mg tablet; Take 4 tablets daily with food for 2 days, 3 tablets daily for 2 days, 2 tablets daily for 2 days, 1 tablet daily for 2 days  Dispense: 20 tablet; Refill: 0    Elevated BP without diagnosis of hypertension  - discussed low sodium diet and life style changes         Return in 2-3 months for annual physical or sooner as needed  The treatment plan and possible side effects of new medications were reviewed with the patient today  The patient understands and agrees with the treatment plan  Subjective:   Chief Complaint   Patient presents with    Sinusitis    Nasal Congestion    Earache     left    Cough      Patient ID: Cooper Saeed is a 50 y o  female who presents today with c/o nasal congestion, sneezing, watery itchy eyes for the past 2-3 weeks, she is now having facial pain/ pressure, clogged ears, frontal headaches, cough, yellow mucus production and chest tightness  Her home Covid test was negative  Patient has been taking Zyrtec and using Flonase without significant relief         The following portions of the patient's history were reviewed and updated as appropriate: allergies, current medications, past family history, past medical history, past social history, past surgical history and problem list     History reviewed  No pertinent past medical history  Past Surgical History:   Procedure Laterality Date     SECTION      x 2    COLONOSCOPY N/A 2018    Procedure: COLONOSCOPY;  Surgeon: Denny Thompson DO;  Location: [facility];   Service: Gastroenterology     Family History   Problem Relation Age of Onset    No Known Problems Mother     Hypertension Father     COPD Father     Cancer Brother     No Known Problems Daughter     Diabetes Maternal Grandmother     Colon cancer Paternal Grandmother     Breast cancer Maternal Aunt 58    No Known Problems Paternal Aunt     Substance Abuse Neg Hx     Mental illness Neg Hx      Social History     Socioeconomic History    Marital status: /Civil Union     Spouse name: Not on file    Number of children: Not on file    Years of education: Not on file    Highest education level: Not on file   Occupational History    Not on file   Tobacco Use    Smoking status: Never    Smokeless tobacco: Never   Vaping Use    Vaping Use: Never used   Substance and Sexual Activity    Alcohol use: Yes     Comment: occassionally    Drug use: No    Sexual activity: Not on file   Other Topics Concern    Not on file   Social History Narrative    Not on file     Social Determinants of Health     Financial Resource Strain: Not on file   Food Insecurity: Not on file   Transportation Needs: Not on file   Physical Activity: Not on file   Stress: Not on file   Social Connections: Not on file   Intimate Partner Violence: Not on file   Housing Stability: Not on file       Current Outpatient Medications:     albuterol (PROVENTIL HFA,VENTOLIN HFA) 90 mcg/act inhaler, Inhale 2 puffs every 6 (six) hours as needed for wheezing or shortness of breath, Disp: 8 g, Rfl: 0    cefuroxime (CEFTIN) 500 mg tablet, Take 1 tablet (500 mg total) by mouth 2 (two) times a day with meals for 10 days, Disp: 20 tablet, Rfl: 0   "cetirizine (ZyrTEC) 10 mg tablet, Take 10 mg by mouth daily, Disp: , Rfl:     fluticasone (FLONASE) 50 mcg/act nasal spray, 2 sprays into each nostril daily, Disp: 3 Bottle, Rfl: 1    norgestrel-ethinyl estradiol (Low-Ogestrel) 0 3 mg-30 mcg per tablet, Take 1 tablet by mouth daily, Disp: 84 tablet, Rfl: 3    predniSONE 10 mg tablet, Take 4 tablets daily with food for 2 days, 3 tablets daily for 2 days, 2 tablets daily for 2 days, 1 tablet daily for 2 days, Disp: 20 tablet, Rfl: 0    Review of Systems   Constitutional: Negative for chills, fatigue and fever  HENT: Positive for congestion, ear pain, postnasal drip, rhinorrhea, sinus pressure and sinus pain  Negative for ear discharge, sore throat, trouble swallowing and voice change  Respiratory: Positive for cough and chest tightness  Negative for shortness of breath and wheezing  Cardiovascular: Negative for chest pain and palpitations  Gastrointestinal: Negative for abdominal pain, diarrhea, nausea and vomiting  Neurological: Positive for headaches  Negative for dizziness  Hematological: Negative for adenopathy  Objective:    Vitals:    05/01/23 1450 05/01/23 1525   BP: 148/88 142/86   BP Location:  Left arm   Patient Position:  Sitting   Cuff Size:  Large   Pulse: 100    Resp: 18    Temp: 97 6 °F (36 4 °C)    TempSrc: Oral    SpO2: 97%    Weight: 104 kg (229 lb)    Height: 5' 4\" (1 626 m)         Physical Exam  Constitutional:       General: She is not in acute distress  Appearance: She is well-developed  HENT:      Head: Normocephalic and atraumatic  Right Ear: Tympanic membrane and ear canal normal       Left Ear: Tympanic membrane and ear canal normal       Nose: Mucosal edema present  Mouth/Throat:      Pharynx: No oropharyngeal exudate or posterior oropharyngeal erythema  Cardiovascular:      Rate and Rhythm: Normal rate and regular rhythm  Heart sounds: Normal heart sounds  No murmur heard    Pulmonary:      " Effort: Pulmonary effort is normal  No respiratory distress  Breath sounds: Normal breath sounds  No wheezing, rhonchi or rales  Musculoskeletal:      Cervical back: Neck supple  Lymphadenopathy:      Cervical: No cervical adenopathy  Neurological:      Mental Status: She is alert and oriented to person, place, and time

## 2023-05-23 DIAGNOSIS — R07.89 CHEST TIGHTNESS: ICD-10-CM

## 2023-05-23 RX ORDER — ALBUTEROL SULFATE 90 UG/1
AEROSOL, METERED RESPIRATORY (INHALATION)
Qty: 8.5 G | Refills: 3 | Status: SHIPPED | OUTPATIENT
Start: 2023-05-23

## 2023-06-22 ENCOUNTER — RA CDI HCC (OUTPATIENT)
Dept: OTHER | Facility: HOSPITAL | Age: 53
End: 2023-06-22

## 2023-06-22 NOTE — PROGRESS NOTES
[facility]  coding opportunities       Chart reviewed, no opportunity found: CHART REVIEWED, NO OPPORTUNITY FOUND        Patients Insurance        Commercial Insurance: Mahendra Whelan

## 2023-07-14 ENCOUNTER — OFFICE VISIT (OUTPATIENT)
Dept: FAMILY MEDICINE CLINIC | Facility: CLINIC | Age: 53
End: 2023-07-14
Payer: COMMERCIAL

## 2023-07-14 VITALS
SYSTOLIC BLOOD PRESSURE: 132 MMHG | RESPIRATION RATE: 16 BRPM | WEIGHT: 222.4 LBS | DIASTOLIC BLOOD PRESSURE: 86 MMHG | BODY MASS INDEX: 37.97 KG/M2 | HEART RATE: 83 BPM | HEIGHT: 64 IN

## 2023-07-14 DIAGNOSIS — Z13.228 SCREENING FOR METABOLIC DISORDER: ICD-10-CM

## 2023-07-14 DIAGNOSIS — N95.1 MENOPAUSAL SYMPTOMS: ICD-10-CM

## 2023-07-14 DIAGNOSIS — Z13.1 SCREENING FOR DIABETES MELLITUS: ICD-10-CM

## 2023-07-14 DIAGNOSIS — Z13.89 SCREENING FOR BLOOD OR PROTEIN IN URINE: ICD-10-CM

## 2023-07-14 DIAGNOSIS — Z00.00 HEALTHCARE MAINTENANCE: ICD-10-CM

## 2023-07-14 DIAGNOSIS — Z13.0 SCREENING FOR BLOOD DISEASE: ICD-10-CM

## 2023-07-14 DIAGNOSIS — Z12.31 ENCOUNTER FOR SCREENING MAMMOGRAM FOR MALIGNANT NEOPLASM OF BREAST: ICD-10-CM

## 2023-07-14 DIAGNOSIS — Z13.220 SCREENING FOR CHOLESTEROL LEVEL: ICD-10-CM

## 2023-07-14 DIAGNOSIS — Z00.00 ANNUAL PHYSICAL EXAM: Primary | ICD-10-CM

## 2023-07-14 DIAGNOSIS — Z13.29 SCREENING FOR THYROID DISORDER: ICD-10-CM

## 2023-07-14 PROCEDURE — 99396 PREV VISIT EST AGE 40-64: CPT | Performed by: FAMILY MEDICINE

## 2023-07-14 NOTE — PROGRESS NOTES
ADULT ANNUAL PHYSICAL  [id number] [facility]    NAME: Rohan Rogers  AGE: 50 y.o. SEX: female  : 1970     DATE: 2023     Assessment and Plan:     Annual physical exam  - screening labs ordered and we will call with results once available  - Lipid Panel with Direct LDL reflex; Future  - CBC and differential; Future  - Comprehensive metabolic panel; Future  - UA (URINE) with reflex to Scope  - TSH, 3rd generation with Free T4 reflex; Future    Menopausal symptoms  - Follicle stimulating hormone; Future    Healthcare maintenance  - Ambulatory Referral to Obstetrics / Gynecology; Future    Immunizations and preventive care screenings were discussed with patient today. Appropriate education was printed on patient's after visit summary. Counseling:  Alcohol/drug use: discussed moderation in alcohol intake, the recommendations for healthy alcohol use, and avoidance of illicit drug use. Dental Health: discussed importance of regular tooth brushing, flossing, and dental visits. Injury prevention: discussed safety/seat belts, safety helmets, smoke detectors, carbon dioxide detectors, and smoking near bedding or upholstery. · Exercise: the importance of regular exercise/physical activity was discussed. Recommend exercise 3-5 times per week for at least 30 minutes. Return in about 1 year (around 7/15/2024) for Annual physical.        Chief Complaint:     Chief Complaint   Patient presents with   • Physical Exam      History of Present Illness:     Adult Annual Physical   Patient here for a comprehensive physical exam. The patient reports occasional hot flushes in the past few months. Diet and Physical Activity  · Diet/Nutrition: well balanced diet. · Exercise: walking. Depression Screening  PHQ-2/9 Depression Screening         General Health  · Sleep: sleeps well and gets 7-8 hours of sleep on average.    · Hearing: no issues. · Vision: goes for regular eye exams. · Dental: regular dental visits. /GYN Health  · Patient is:   · Last menstrual period: regular light periods on OCP's  · Contraceptive method: oral contraceptives. Review of Systems:     Review of Systems   Constitutional: Negative for appetite change, chills, fatigue, fever and unexpected weight change. HENT: Negative for congestion, ear pain, sore throat and trouble swallowing. Eyes: Negative for pain, discharge, redness, itching and visual disturbance. Respiratory: Negative for cough, shortness of breath and wheezing. Cardiovascular: Negative for chest pain, palpitations and leg swelling. Gastrointestinal: Negative for abdominal pain, blood in stool, constipation, diarrhea, nausea and vomiting. Endocrine: Negative for cold intolerance, heat intolerance, polydipsia and polyuria. Genitourinary: Negative for dysuria, frequency, hematuria, pelvic pain, urgency and vaginal discharge. Musculoskeletal: Negative for back pain, gait problem, joint swelling and myalgias. Skin: Negative for rash. Neurological: Negative for dizziness, syncope, weakness, numbness and headaches. Hematological: Negative for adenopathy. Psychiatric/Behavioral: Negative for dysphoric mood and sleep disturbance. The patient is not nervous/anxious. Past Medical History:     History reviewed. No pertinent past medical history. Past Surgical History:     Past Surgical History:   Procedure Laterality Date   •  SECTION      x 2   • COLONOSCOPY N/A 2018    Procedure: COLONOSCOPY;  Surgeon: Carlos Yañez DO;  Location: [facility] GI [facility];   Service: Gastroenterology      Social History:     Social History     Socioeconomic History   • Marital status: /Civil Union     Spouse name: None   • Number of children: None   • Years of education: None   • Highest education level: None   Occupational History   • None   Tobacco Use   • Smoking status: Never   • Smokeless tobacco: Never   Vaping Use   • Vaping Use: Never used   Substance and Sexual Activity   • Alcohol use: Yes     Comment: occassionally   • Drug use: No   • Sexual activity: None   Other Topics Concern   • None   Social History Narrative   • None     Social Determinants of Health     Financial Resource Strain: Not on file   Food Insecurity: Not on file   Transportation Needs: Not on file   Physical Activity: Not on file   Stress: Not on file   Social Connections: Not on file   Intimate Partner Violence: Not on file   Housing Stability: Not on file      Family History:     Family History   Problem Relation Age of Onset   • No Known Problems Mother    • Hypertension Father    • COPD Father    • Cancer Brother    • No Known Problems Daughter    • Diabetes Maternal Grandmother    • Colon cancer Paternal Grandmother    • Breast cancer Maternal Aunt 62   • No Known Problems Paternal Aunt    • Substance Abuse Neg Hx    • Mental illness Neg Hx       Current Medications:     Current Outpatient Medications   Medication Sig Dispense Refill   • albuterol (PROVENTIL HFA,VENTOLIN HFA) 90 mcg/act inhaler TAKE 2 PUFFS BY MOUTH EVERY 6 HOURS AS NEEDED FOR WHEEZE OR FOR SHORTNESS OF BREATH 8.5 g 3   • cetirizine (ZyrTEC) 10 mg tablet Take 10 mg by mouth daily     • fluticasone (FLONASE) 50 mcg/act nasal spray 2 sprays into each nostril daily 3 Bottle 1   • Low-Ogestrel 0.3-30 MG-MCG per tablet TAKE 1 TABLET BY MOUTH EVERY DAY 84 tablet 0     No current facility-administered medications for this visit. Allergies: Allergies   Allergen Reactions   • Other Allergic Rhinitis     Seasonal       Physical Exam:     /86 (BP Location: Left arm, Patient Position: Sitting, Cuff Size: Large)   Pulse 83   Resp 16   Ht 5' 3.75" (1.619 m)   Wt 101 kg (222 lb 6.4 oz)   BMI 38.47 kg/m²     Physical Exam  Constitutional:       General: She is not in acute distress. Appearance: She is well-developed.    HENT: Head: Normocephalic and atraumatic. Right Ear: Tympanic membrane, ear canal and external ear normal.      Left Ear: Tympanic membrane, ear canal and external ear normal.      Mouth/Throat:      Mouth: Mucous membranes are moist.      Pharynx: Oropharynx is clear. Eyes:      General: No scleral icterus. Extraocular Movements: Extraocular movements intact. Conjunctiva/sclera: Conjunctivae normal.      Pupils: Pupils are equal, round, and reactive to light. Neck:      Thyroid: No thyromegaly. Vascular: No JVD. Cardiovascular:      Rate and Rhythm: Normal rate and regular rhythm. Heart sounds: Normal heart sounds. No murmur heard. Pulmonary:      Effort: Pulmonary effort is normal. No respiratory distress. Breath sounds: Normal breath sounds. No wheezing, rhonchi or rales. Abdominal:      General: Bowel sounds are normal. There is no distension. Palpations: Abdomen is soft. There is no mass. Tenderness: There is no abdominal tenderness. Musculoskeletal:      Cervical back: Neck supple. Right lower leg: No edema. Left lower leg: No edema. Lymphadenopathy:      Cervical: No cervical adenopathy. Skin:     Findings: No rash. Neurological:      General: No focal deficit present. Mental Status: She is alert and oriented to person, place, and time. Cranial Nerves: No cranial nerve deficit. Psychiatric:         Mood and Affect: Mood normal.         Behavior: Behavior normal.         Thought Content: Thought content normal.          Jonathan Marie MD  [address]  BMI Counseling: Body mass index is 38.47 kg/m². The BMI is above normal. Nutrition recommendations include reducing portion sizes, 3-5 servings of fruits/vegetables daily and consuming healthier snacks. Exercise recommendations include exercising 3-5 times per week.

## 2023-08-04 ENCOUNTER — APPOINTMENT (EMERGENCY)
Dept: RADIOLOGY | Facility: HOSPITAL | Age: 53
End: 2023-08-04
Payer: COMMERCIAL

## 2023-08-04 ENCOUNTER — OFFICE VISIT (OUTPATIENT)
Dept: URGENT CARE | Facility: CLINIC | Age: 53
End: 2023-08-04
Payer: COMMERCIAL

## 2023-08-04 ENCOUNTER — HOSPITAL ENCOUNTER (EMERGENCY)
Facility: HOSPITAL | Age: 53
Discharge: HOME | End: 2023-08-04
Attending: EMERGENCY MEDICINE
Payer: COMMERCIAL

## 2023-08-04 VITALS
TEMPERATURE: 97.8 F | RESPIRATION RATE: 18 BRPM | OXYGEN SATURATION: 97 % | SYSTOLIC BLOOD PRESSURE: 135 MMHG | HEART RATE: 84 BPM | DIASTOLIC BLOOD PRESSURE: 72 MMHG

## 2023-08-04 VITALS
DIASTOLIC BLOOD PRESSURE: 84 MMHG | WEIGHT: 220 LBS | TEMPERATURE: 97.6 F | RESPIRATION RATE: 18 BRPM | OXYGEN SATURATION: 96 % | HEIGHT: 64 IN | SYSTOLIC BLOOD PRESSURE: 160 MMHG | BODY MASS INDEX: 37.56 KG/M2

## 2023-08-04 DIAGNOSIS — R07.9 CHEST PAIN: Primary | ICD-10-CM

## 2023-08-04 DIAGNOSIS — R07.9 CHEST PAIN, UNSPECIFIED TYPE: Primary | ICD-10-CM

## 2023-08-04 LAB
ALBUMIN SERPL BCP-MCNC: 3.9 G/DL (ref 3.5–5)
ALP SERPL-CCNC: 52 U/L (ref 46–116)
ALT SERPL W P-5'-P-CCNC: 19 U/L (ref 12–78)
ANION GAP SERPL CALCULATED.3IONS-SCNC: 6 MMOL/L
AST SERPL W P-5'-P-CCNC: 12 U/L (ref 5–45)
ATRIAL RATE: 85 BPM
ATRIAL RATE: 86 BPM
ATRIAL RATE: 86 BPM
BASOPHILS # BLD AUTO: 0.05 THOUSANDS/ÂΜL (ref 0–0.1)
BASOPHILS NFR BLD AUTO: 1 % (ref 0–1)
BILIRUB SERPL-MCNC: 0.26 MG/DL (ref 0.2–1)
BUN SERPL-MCNC: 14 MG/DL (ref 5–25)
CALCIUM SERPL-MCNC: 9.3 MG/DL (ref 8.3–10.1)
CARDIAC TROPONIN I PNL SERPL HS: <2 NG/L
CHLORIDE SERPL-SCNC: 108 MMOL/L (ref 96–108)
CO2 SERPL-SCNC: 25 MMOL/L (ref 21–32)
CREAT SERPL-MCNC: 0.74 MG/DL (ref 0.6–1.3)
EOSINOPHIL # BLD AUTO: 0.13 THOUSAND/ÂΜL (ref 0–0.61)
EOSINOPHIL NFR BLD AUTO: 1 % (ref 0–6)
ERYTHROCYTE [DISTWIDTH] IN BLOOD BY AUTOMATED COUNT: 12.8 % (ref 11.6–15.1)
GFR SERPL CREATININE-BSD FRML MDRD: 94 ML/MIN/1.73SQ M
GLUCOSE SERPL-MCNC: 93 MG/DL (ref 65–140)
HCT VFR BLD AUTO: 37.6 % (ref 34.8–46.1)
HGB BLD-MCNC: 13 G/DL (ref 11.5–15.4)
IMM GRANULOCYTES # BLD AUTO: 0.03 THOUSAND/UL (ref 0–0.2)
IMM GRANULOCYTES NFR BLD AUTO: 0 % (ref 0–2)
LYMPHOCYTES # BLD AUTO: 3.7 THOUSANDS/ÂΜL (ref 0.6–4.47)
LYMPHOCYTES NFR BLD AUTO: 40 % (ref 14–44)
MCH RBC QN AUTO: 29.4 PG (ref 26.8–34.3)
MCHC RBC AUTO-ENTMCNC: 34.6 G/DL (ref 31.4–37.4)
MCV RBC AUTO: 85 FL (ref 82–98)
MONOCYTES # BLD AUTO: 0.56 THOUSAND/ÂΜL (ref 0.17–1.22)
MONOCYTES NFR BLD AUTO: 6 % (ref 4–12)
NEUTROPHILS # BLD AUTO: 4.7 THOUSANDS/ÂΜL (ref 1.85–7.62)
NEUTS SEG NFR BLD AUTO: 52 % (ref 43–75)
NRBC BLD AUTO-RTO: 0 /100 WBCS
P AXIS: 14 DEGREES
P AXIS: 14 DEGREES
P AXIS: 67 DEGREES
PLATELET # BLD AUTO: 262 THOUSANDS/UL (ref 149–390)
PMV BLD AUTO: 10.9 FL (ref 8.9–12.7)
POTASSIUM SERPL-SCNC: 3.8 MMOL/L (ref 3.5–5.3)
PR INTERVAL: 144 MS
PR INTERVAL: 144 MS
PR INTERVAL: 148 MS
PROT SERPL-MCNC: 7.8 G/DL (ref 6.4–8.4)
QRS AXIS: 33 DEGREES
QRS AXIS: 57 DEGREES
QRS AXIS: 57 DEGREES
QRSD INTERVAL: 74 MS
QRSD INTERVAL: 74 MS
QRSD INTERVAL: 76 MS
QT INTERVAL: 376 MS
QT INTERVAL: 376 MS
QT INTERVAL: 378 MS
QTC INTERVAL: 449 MS
RBC # BLD AUTO: 4.42 MILLION/UL (ref 3.81–5.12)
SODIUM SERPL-SCNC: 139 MMOL/L (ref 135–147)
T WAVE AXIS: 28 DEGREES
T WAVE AXIS: 28 DEGREES
T WAVE AXIS: 31 DEGREES
VENTRICULAR RATE: 85 BPM
VENTRICULAR RATE: 86 BPM
VENTRICULAR RATE: 86 BPM
WBC # BLD AUTO: 9.17 THOUSAND/UL (ref 4.31–10.16)

## 2023-08-04 PROCEDURE — 80053 COMPREHEN METABOLIC PANEL: CPT | Performed by: EMERGENCY MEDICINE

## 2023-08-04 PROCEDURE — G0382 LEV 3 HOSP TYPE B ED VISIT: HCPCS

## 2023-08-04 PROCEDURE — 93010 ELECTROCARDIOGRAM REPORT: CPT | Performed by: INTERNAL MEDICINE

## 2023-08-04 PROCEDURE — 93005 ELECTROCARDIOGRAM TRACING: CPT

## 2023-08-04 PROCEDURE — 84484 ASSAY OF TROPONIN QUANT: CPT | Performed by: EMERGENCY MEDICINE

## 2023-08-04 PROCEDURE — 99285 EMERGENCY DEPT VISIT HI MDM: CPT | Performed by: EMERGENCY MEDICINE

## 2023-08-04 PROCEDURE — S9083 URGENT CARE CENTER GLOBAL: HCPCS

## 2023-08-04 PROCEDURE — 36415 COLL VENOUS BLD VENIPUNCTURE: CPT

## 2023-08-04 PROCEDURE — 71045 X-RAY EXAM CHEST 1 VIEW: CPT

## 2023-08-04 PROCEDURE — 85025 COMPLETE CBC W/AUTO DIFF WBC: CPT | Performed by: EMERGENCY MEDICINE

## 2023-08-04 PROCEDURE — 99285 EMERGENCY DEPT VISIT HI MDM: CPT

## 2023-08-04 NOTE — DISCHARGE INSTRUCTIONS
You were evaluated in the Emergency Department today for chest pain. Your evaluation has shown no signs of medical conditions requiring emergent intervention at this time, however we recommend that you follow up with your primary care physician or your cardiologist as soon as possible for further testing as an outpatient. Please schedule an appointment for follow up with your primary care physician as soon as possible. Return to the Emergency Department if you experience worsening or uncontrolled chest pain, shortness of breath, light headedness, feeling faint, nausea, vomiting, or any other concerning symptoms.

## 2023-08-04 NOTE — PROGRESS NOTES
[facility]        NAME: Liliya Remy is a 50 y.o. female  : 1970    MRN: [id number]  DATE: 2023  TIME: 4:33 PM    Assessment and Plan   Chest pain, unspecified type [R07.9]  1. Chest pain, unspecified type  Transfer to other facility        Chest pressure since yesterday evening. Tightness between shoulder blades. EKG was NSR rate of 74, no depression or elevation. Discussed concern that I am unable to rule out acute causes of her chest pressure and that evaluation in the ED would be recommended. Offered and declined for ambulance to take her to the ED multiple times. Patient Instructions     Agreed to drive to the [facility] ED. Declined Ambulance. Chief Complaint     Chief Complaint   Patient presents with   • Chest Pain     PT presents with with central chest tightness x 1 day. She denies any hx of asthma, no pain in left arm or jaw pain. She does state some tightness in the between shoulder blades         History of Present Illness       Tightness in the chest starting last evening and thought it would go away. States that nothing seems to make it better or worse. Also states that she notices tightness between her shoulder blades. Currently speaking in full sentences and not reporting SOB. Did have a long car ride of about 1 hour to reading yesterday where she was the . Review of Systems   Review of Systems   Constitutional: Negative for chills and fever. HENT: Negative for ear pain and sore throat. Eyes: Negative for pain and visual disturbance. Respiratory: Negative for cough, shortness of breath and wheezing. Cardiovascular: Positive for chest pain (Reports it as chest pressure. ). Negative for palpitations. Gastrointestinal: Negative for abdominal pain, diarrhea, nausea and vomiting. Genitourinary: Negative for dysuria and hematuria. Musculoskeletal: Negative for arthralgias and back pain. Skin: Negative for color change and rash. Neurological: Negative for dizziness, seizures, syncope, weakness, light-headedness and numbness. All other systems reviewed and are negative. Current Medications       Current Outpatient Medications:   •  cetirizine (ZyrTEC) 10 mg tablet, Take 10 mg by mouth daily, Disp: , Rfl:   •  fluticasone (FLONASE) 50 mcg/act nasal spray, 2 sprays into each nostril daily, Disp: 3 Bottle, Rfl: 1  •  Low-Ogestrel 0.3-30 MG-MCG per tablet, TAKE 1 TABLET BY MOUTH EVERY DAY, Disp: 84 tablet, Rfl: 0  •  albuterol (PROVENTIL HFA,VENTOLIN HFA) 90 mcg/act inhaler, TAKE 2 PUFFS BY MOUTH EVERY 6 HOURS AS NEEDED FOR WHEEZE OR FOR SHORTNESS OF BREATH (Patient not taking: Reported on 2023), Disp: 8.5 g, Rfl: 3    Current Allergies     Allergies as of 2023 - Reviewed 2023   Allergen Reaction Noted   • Other Allergic Rhinitis 2017            The following portions of the patient's history were reviewed and updated as appropriate: allergies, current medications, past family history, past medical history, past social history, past surgical history and problem list.     History reviewed. No pertinent past medical history. Past Surgical History:   Procedure Laterality Date   •  SECTION      x 2   • COLONOSCOPY N/A 2018    Procedure: COLONOSCOPY;  Surgeon: Adrián Vincent DO;  Location: [facility]; Service: Gastroenterology       Family History   Problem Relation Age of Onset   • No Known Problems Mother    • Hypertension Father    • COPD Father    • Cancer Brother    • No Known Problems Daughter    • Diabetes Maternal Grandmother    • Colon cancer Paternal Grandmother    • Breast cancer Maternal Aunt 58   • No Known Problems Paternal Aunt    • Substance Abuse Neg Hx    • Mental illness Neg Hx          Medications have been verified.         Objective   /84   Temp 97.6 °F (36.4 °C)   Resp 18   Ht 5' 4" (1.626 m)   Wt 99.8 kg (220 lb)   SpO2 96%   BMI 37.76 kg/m²   No LMP recorded. Physical Exam     Physical Exam  Vitals and nursing note reviewed. Constitutional:       Appearance: Normal appearance. HENT:      Head: Normocephalic and atraumatic. Cardiovascular:      Rate and Rhythm: Normal rate and regular rhythm. Heart sounds: Normal heart sounds. Pulmonary:      Effort: Pulmonary effort is normal. No tachypnea, accessory muscle usage or respiratory distress. Breath sounds: Normal breath sounds. No wheezing, rhonchi or rales. Chest:       Musculoskeletal:      Cervical back: Normal range of motion and neck supple. Skin:     General: Skin is warm and dry. Capillary Refill: Capillary refill takes less than 2 seconds. Neurological:      General: No focal deficit present. Mental Status: She is alert and oriented to person, place, and time. Mental status is at baseline. Sensory: No sensory deficit. Motor: No weakness. Psychiatric:         Mood and Affect: Mood normal.         Behavior: Behavior normal.         Thought Content:  Thought content normal. no

## 2023-08-04 NOTE — ED PROVIDER NOTES
History  Chief Complaint   Patient presents with   • Chest Pain     Since last night having left side chest tightness. Pt complains of no other symptoms. Went to urgent care today who advised pt to come to ER      HPI  Patient is 50 y.o. female  who presents to the emergency department for left sided chest tightness since yesterday. Patient reports intermittent 6/10 chest tightness, non radiating. Patient reports pain comes and goes without provocation. Patient denies fever, cough, congestion, SOB, abdominal pain, n/v, diaphoresis or leg edema. Prior to Admission Medications   Prescriptions Last Dose Informant Patient Reported? Taking? Low-Ogestrel 0.3-30 MG-MCG per tablet   No No   Sig: TAKE 1 TABLET BY MOUTH EVERY DAY   albuterol (PROVENTIL HFA,VENTOLIN HFA) 90 mcg/act inhaler   No No   Sig: TAKE 2 PUFFS BY MOUTH EVERY 6 HOURS AS NEEDED FOR WHEEZE OR FOR SHORTNESS OF BREATH   Patient not taking: Reported on 2023   cetirizine (ZyrTEC) 10 mg tablet   Yes No   Sig: Take 10 mg by mouth daily   fluticasone (FLONASE) 50 mcg/act nasal spray   No No   Si sprays into each nostril daily      Facility-Administered Medications: None       History reviewed. No pertinent past medical history. Past Surgical History:   Procedure Laterality Date   •  SECTION      x 2   • COLONOSCOPY N/A 2018    Procedure: COLONOSCOPY;  Surgeon: Gonsalo Harrington DO;  Location: [facility] GI LAB; Service: Gastroenterology       Family History   Problem Relation Age of Onset   • No Known Problems Mother    • Hypertension Father    • COPD Father    • Cancer Brother    • No Known Problems Daughter    • Diabetes Maternal Grandmother    • Colon cancer Paternal Grandmother    • Breast cancer Maternal Aunt 58   • No Known Problems Paternal Aunt    • Substance Abuse Neg Hx    • Mental illness Neg Hx      I have reviewed and agree with the history as documented.     E-Cigarette/Vaping   • E-Cigarette Use Never User E-Cigarette/Vaping Substances     Social History     Tobacco Use   • Smoking status: Never   • Smokeless tobacco: Never   Vaping Use   • Vaping Use: Never used   Substance Use Topics   • Alcohol use: Yes     Comment: occassionally   • Drug use: No        Review of Systems   Constitutional: Negative for chills and fever. HENT: Negative for congestion and sore throat. Respiratory: Negative for cough and shortness of breath. Cardiovascular: Positive for chest pain. Negative for leg swelling. Gastrointestinal: Negative for abdominal pain, nausea and vomiting. Genitourinary: Negative for dysuria and hematuria. Skin: Negative for rash. Neurological: Negative for dizziness and headaches. Physical Exam  ED Triage Vitals   Temperature Pulse Respirations Blood Pressure SpO2   08/04/23 1655 08/04/23 1655 08/04/23 1655 08/04/23 1655 08/04/23 1655   97.8 °F (36.6 °C) 89 17 151/90 98 %      Temp Source Heart Rate Source Patient Position - Orthostatic VS BP Location FiO2 (%)   08/04/23 1655 08/04/23 1655 08/04/23 1730 08/04/23 1655 --   Oral Monitor Sitting Left arm       Pain Score       08/04/23 1655       6             Orthostatic Vital Signs  Vitals:    08/04/23 1655 08/04/23 1730 08/04/23 1800 08/04/23 1830   BP: 151/90 148/82 148/82 135/72   Pulse: 89 84 82 84   Patient Position - Orthostatic VS:  Sitting Sitting Sitting       Physical Exam  Vitals reviewed. Constitutional:       General: She is awake. HENT:      Head: Normocephalic and atraumatic. Mouth/Throat:      Mouth: Mucous membranes are moist.   Eyes:      Extraocular Movements: Extraocular movements intact. Right eye: No nystagmus. Left eye: No nystagmus. Conjunctiva/sclera: Conjunctivae normal.      Pupils: Pupils are equal, round, and reactive to light. Cardiovascular:      Rate and Rhythm: Normal rate and regular rhythm. Pulses: Normal pulses.       Heart sounds: Normal heart sounds, S1 normal and S2 normal. Heart sounds not distant. No murmur heard. No friction rub. No gallop. Pulmonary:      Breath sounds: No stridor. No wheezing, rhonchi or rales. Comments: CTA b/l   Abdominal:      General: Bowel sounds are normal.      Palpations: Abdomen is soft. Tenderness: There is no abdominal tenderness. Musculoskeletal:      Right lower leg: No edema. Left lower leg: No edema. Skin:     General: Skin is warm and dry. Capillary Refill: Capillary refill takes less than 2 seconds. Neurological:      Mental Status: She is alert and oriented to person, place, and time. GCS: GCS eye subscore is 4. GCS verbal subscore is 5. GCS motor subscore is 6.          ED Medications  Medications - No data to display    Diagnostic Studies  Results Reviewed     Procedure Component Value Units Date/Time    Comprehensive metabolic panel [[id number]] Collected: 08/04/23 1709    Lab Status: Final result Specimen: Blood from Arm, Right Updated: 08/04/23 175     Sodium 139 mmol/L      Potassium 3.8 mmol/L      Chloride 108 mmol/L      CO2 25 mmol/L      ANION GAP 6 mmol/L      BUN 14 mg/dL      Creatinine 0.74 mg/dL      Glucose 93 mg/dL      Calcium 9.3 mg/dL      AST 12 U/L      ALT 19 U/L      Alkaline Phosphatase 52 U/L      Total Protein 7.8 g/dL      Albumin 3.9 g/dL      Total Bilirubin 0.26 mg/dL      eGFR 94 ml/min/1.73sq m     Narrative:      [facility] guidelines for Chronic Kidney Disease (CKD):   •  Stage 1 with normal or high GFR (GFR > 90 mL/min/1.73 square meters)  •  Stage 2 Mild CKD (GFR = 60-89 mL/min/1.73 square meters)  •  Stage 3A Moderate CKD (GFR = 45-59 mL/min/1.73 square meters)  •  Stage 3B Moderate CKD (GFR = 30-44 mL/min/1.73 square meters)  •  Stage 4 Severe CKD (GFR = 15-29 mL/min/1.73 square meters)  •  Stage 5 End Stage CKD (GFR <15 mL/min/1.73 square meters)  Note: GFR calculation is accurate only with a steady state creatinine    HS Troponin 0hr (reflex protocol) [[id number]]  (Normal) Collected: 08/04/23 1709    Lab Status: Final result Specimen: Blood from Arm, Right Updated: 08/04/23 1751     hs TnI 0hr <2 ng/L     CBC and differential [[id number]] Collected: 08/04/23 1709    Lab Status: Final result Specimen: Blood from Arm, Right Updated: 08/04/23 1723     WBC 9.17 Thousand/uL      RBC 4.42 Million/uL      Hemoglobin 13.0 g/dL      Hematocrit 37.6 %      MCV 85 fL      MCH 29.4 pg      MCHC 34.6 g/dL      RDW 12.8 %      MPV 10.9 fL      Platelets 711 Thousands/uL      nRBC 0 /100 WBCs      Neutrophils Relative 52 %      Immat GRANS % 0 %      Lymphocytes Relative 40 %      Monocytes Relative 6 %      Eosinophils Relative 1 %      Basophils Relative 1 %      Neutrophils Absolute 4.70 Thousands/µL      Immature Grans Absolute 0.03 Thousand/uL      Lymphocytes Absolute 3.70 Thousands/µL      Monocytes Absolute 0.56 Thousand/µL      Eosinophils Absolute 0.13 Thousand/µL      Basophils Absolute 0.05 Thousands/µL                  XR chest 1 view portable   ED Interpretation by Juanjose Artis DO (08/04 1856)   No acute cardiopulmonary disease. Procedures  Procedures      ED Course  ED Course as of 08/04/23 1933   Fri Aug 04, 2023   1701 Procedure Note: EKG  Date/Time: 08/04/23 5:01 PM   Interpreted by: Jennifer Schumacher  Indications / Diagnosis: CP  ECG reviewed by me, the ED Provider: yes   The EKG demonstrates:  Rhythm: normal sinus  Intervals: normal intervals  Axis: normal axis  QRS/Blocks: normal QRS  ST Changes: No acute ST Changes, no STD/SANJAY. AMIRAT    Flowsheet Row Most Recent Value   DIANNE Initial Screen: During the past 12 months, did you:    1. Drink any alcohol (more than a few sips)? No Filed at: 08/04/2023 1658   2. Smoke any marijuana or hashish No Filed at: 08/04/2023 1658   3. Use anything else to get high? ("anything else" includes illegal drugs, over the counter and prescription drugs, and things that you sniff or 'lucas')? No Filed at: 08/04/2023 1658                          SBIRT 22yo+    Flowsheet Row Most Recent Value   Initial Alcohol Screen: US AUDIT-C     1. How often do you have a drink containing alcohol? 0 Filed at: 08/04/2023 1658   2. How many drinks containing alcohol do you have on a typical day you are drinking? 0 Filed at: 08/04/2023 1658   3a. Male UNDER 65: How often do you have five or more drinks on one occasion? 0 Filed at: 08/04/2023 1658   3b. FEMALE Any Age, or MALE 65+: How often do you have 4 or more drinks on one occassion? 0 Filed at: 08/04/2023 1658   Audit-C Score 0 Filed at: 08/04/2023 1658   ALISSA: How many times in the past year have you. .. Used an illegal drug or used a prescription medication for non-medical reasons? Never Filed at: 08/04/2023 1658          Wells' Criteria for PE    Flowsheet Row Most Recent Value   Wells' Criteria for PE    Clinical signs and symptoms of DVT 0 Filed at: 08/04/2023 1721   PE is primary diagnosis or equally likely 0 Filed at: 08/04/2023 1721   HR >100 0 Filed at: 08/04/2023 1721   Immobilization at least 3 days or Surgery in the previous 4 weeks 0 Filed at: 08/04/2023 1721   Previous, objectively diagnosed PE or DVT 0 Filed at: 08/04/2023 1721   Hemoptysis 0 Filed at: 08/04/2023 1721   Malignancy with treatment within 6 months or palliative 0 Filed at: 08/04/2023 1721   Wells' Criteria Total 0 Filed at: 08/04/2023 1721            MDM   Patient is 50 y.o. female  who presents to the ED with left chest tightness. Vital signs stable. On exam RRR, no murmurs, rubs, gallops, breath sounds clear b/l no rales, wheezing, rhonci, no actue distress. History and physical exam most consistent with anxiety However, differential diagnosis included but not limited to ACS, pneumonia, musculoskeletal pain. Plan CBC, CMP, troponin, EKG and CXR. PE unlikely given low Well's and clinical picture. View ED course above for further discussion on patient workup.      All labs reviewed and utilized in the medical decision making process  All radiology studies independently viewed by me and interpreted by the radiologist.  I reviewed all testing with the patient. Upon re-evaluation patient remains stable. Workup not concerning for ACS or pneumonia likely anxiety related or musculoskeletal in nature. Given age will refer to cardiology for outpatient workup. I have reviewed the patient's vital signs, nursing notes, and other relevant tests/information. I had a detailed discussion with the patient regarding the history, exam findings, and any diagnostic results. Plan to discharge home in stable condition with unspecified chest pain, follow up with PCP and cardiology. Discussed with patient who is agreeable to plan. I discussed discharge instructions, need for follow-up, and oral return precautions for what to return for in addition to the written return precautions and discharge instructions, specifically highlighting areas of special concern. The patient verbalized understanding of the discharge instructions and warnings that would necessitate return to the Emergency Department including  worsening or uncontrolled chest pain, shortness of breath, light headedness, feeling faint, nausea, vomiting  All questions the patient had were answered prior to discharge to the best of my ability. Disposition  Final diagnoses:   Chest pain     Time reflects when diagnosis was documented in both MDM as applicable and the Disposition within this note     Time User Action Codes Description Comment    8/4/2023  6:56 PM Neil Beck Add [R07.9] Chest pain       ED Disposition     ED Disposition   Discharge    Condition   Stable    Date/Time   Fri Aug 4, 2023  6:56 PM    Comment   Brant Morales discharge to home/self care.                Follow-up Information     Follow up With Specialties Details Why Contact Info Additional [address] Cardiology  follow up for outpatient cardiac workup. [address]  [address], [address], [place], [place], [address] [facility]    Griselda Cai, MD Family Medicine  follow up for resolution of symptoms. [address]  [address] Emergency Department Emergency Medicine Go to  If symptoms worsen [address] [id number]  [facility] Emergency Department, [address], [place], [place], [facility]          Discharge Medication List as of 8/4/2023  6:57 PM      CONTINUE these medications which have NOT CHANGED    Details   albuterol (PROVENTIL HFA,VENTOLIN HFA) 90 mcg/act inhaler TAKE 2 PUFFS BY MOUTH EVERY 6 HOURS AS NEEDED FOR WHEEZE OR FOR SHORTNESS OF BREATH, Normal      cetirizine (ZyrTEC) 10 mg tablet Take 10 mg by mouth daily, Historical Med      fluticasone (FLONASE) 50 mcg/act nasal spray 2 sprays into each nostril daily, Starting Mon 4/6/2020, Normal      Low-Ogestrel 0.3-30 MG-MCG per tablet TAKE 1 TABLET BY MOUTH EVERY DAY, Normal               PDMP Review     None           ED Provider  Attending physically available and evaluated Grace Perez. I managed the patient along with the ED Attending.     Electronically Signed by         Ritu Weller DO  08/04/23 1933

## 2023-08-06 NOTE — ED ATTENDING ATTESTATION
8/4/2023  IEros MD, saw and evaluated the patient. I have discussed the patient with the resident/non-physician practitioner and agree with the resident's/non-physician practitioner's findings, Plan of Care, and MDM as documented in the resident's/non-physician practitioner's note, except where noted. All available labs and Radiology studies were reviewed. I was present for key portions of any procedure(s) performed by the resident/non-physician practitioner and I was immediately available to provide assistance. At this point I agree with the current assessment done in the Emergency Department. I have conducted an independent evaluation of this patient a history and physical is as follows: She is a 27-year-old female presents with left-sided chest pain onset of symptoms was last night she describes the pain as a tightness sensation denies shortness of breath nausea vomiting diaphoresis. Patient states the pain has been constant however has been waxing and waning in intensity. Pain does not appear to be related to exertion or relieved with rest.    No history of DVT PE recent travel or surgery. Vital signs reviewed. Patient well-appearing nontoxic no acute distress heart regular rate and rhythm without murmurs. Lungs clear to auscultation bilaterally. Abdomen soft nontender nondistended normal bowel sounds. Extremities no edema. No calf tenderness    Impression: Chest pain  Differential diagnosis: ACS, MI, arrhythmia, pneumothorax, PE , pneumonia, symptomatic anemia doubt asthma, GERD    Plan to check ECG, chest x-ray CMP CBC troponin reassess patient declines any analgesia at this time. ECG independently interpreted by me normal sinus rhythm normal rate normal axis normal intervals no acute ST-T changes. Chest x-ray independently interpreted by me no acute cardiopulmonary disease.     Labs reviewed: CBC unremarkable initial troponin less than 2 given constant nature and Regarding: wi=bloody nose/clots /what to do  ----- Message from Natasha Jo sent at 1/28/2022  6:23 PM CST -----  Patient Name: Angel Norton    Full Name of Provider seen for current symptoms:Diana Torres    Symptoms: bloody nose/clots /what to do    Pregnant (If Yes, how long?):    Call Back #:[phone]    Call Center Account # for provider seen for current symptoms:[id number]    Which State are you currently located in? (enter State name in Summary field): [place]    Patients needing callback from the RN are informed of the following:   “Please be aware your return call may come from an unidentified phone number. Please answer all incoming calls until you hear from our nurse. Our callback times vary based on call volumes. If at any time your condition becomes worse, you should seek immediate medical assistance by calling 911 or going to an Urgent Care or Emergency Department for evaluation.”      duration of symptoms will not send delta troponin. CMP unremarkable. Decision regarding hospitalization: All results reviewed with patient and significant other at bedside by myself and resident. Patient has a Wells score of 0 based on risk factors I have considered possibility of pulmonary embolism I believe it highly unlikely given clinical picture and low Wells score. Patient has a heart score of 2 based on history, age. Based on heart pathway we will discharge patient home to follow-up with PCP and cardiology as outpatient return precautions given.     ED Course         Critical Care Time  Procedures

## 2023-09-08 ENCOUNTER — OFFICE VISIT (OUTPATIENT)
Age: 53
End: 2023-09-08
Payer: COMMERCIAL

## 2023-09-08 VITALS
SYSTOLIC BLOOD PRESSURE: 144 MMHG | BODY MASS INDEX: 38.48 KG/M2 | WEIGHT: 225.4 LBS | HEIGHT: 64 IN | DIASTOLIC BLOOD PRESSURE: 82 MMHG

## 2023-09-08 DIAGNOSIS — N95.1 PERIMENOPAUSE: ICD-10-CM

## 2023-09-08 DIAGNOSIS — Z01.419 ROUTINE GYNECOLOGICAL EXAMINATION: Primary | ICD-10-CM

## 2023-09-08 DIAGNOSIS — Z00.00 HEALTHCARE MAINTENANCE: ICD-10-CM

## 2023-09-08 PROCEDURE — S0610 ANNUAL GYNECOLOGICAL EXAMINA: HCPCS | Performed by: OBSTETRICS & GYNECOLOGY

## 2023-09-08 NOTE — PROGRESS NOTES
Marco Antonio Vegas  1970      CC:  Yearly exam    S:  50 y.o. female here for yearly exam. Her cycles are regular, not heavy or crampy. 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      She denies vaginal discharge, itching, pelvic pain. She has no urinary concerns, does not have incontinence. No bowel concerns. No breast concerns. Sexual activity: She is sexually active without pain, bleeding or dryness. She is  and monogamous. She is not interested in STD screening today. Contraception: She uses OCP for contraception. Last Pap: 12/2019 - NILM, Neg HPV  Last Mammo: 10/2022 - [facility] 1  Colonoscopy 2/2019 - 10yr recall     We reviewed ASCCP guidelines for Pap testing today. Family hx of breast cancer: M Aunt  Family hx of ovarian cancer: no  Family hx of colon cancer: PGM      Current Outpatient Medications:   •  cetirizine (ZyrTEC) 10 mg tablet, Take 10 mg by mouth daily, Disp: , Rfl:   •  fluticasone (FLONASE) 50 mcg/act nasal spray, 2 sprays into each nostril daily, Disp: 3 Bottle, Rfl: 1  •  Low-Ogestrel 0.3-30 MG-MCG per tablet, TAKE 1 TABLET BY MOUTH EVERY DAY, Disp: 84 tablet, Rfl: 0  •  albuterol (PROVENTIL HFA,VENTOLIN HFA) 90 mcg/act inhaler, TAKE 2 PUFFS BY MOUTH EVERY 6 HOURS AS NEEDED FOR WHEEZE OR FOR SHORTNESS OF BREATH (Patient not taking: Reported on 8/4/2023), Disp: 8.5 g, Rfl: 3  Patient Active Problem List   Diagnosis   • Change in bowel movement     No past medical history on file. Family History   Problem Relation Age of Onset   • No Known Problems Mother    • Hypertension Father    • COPD Father    • Cancer Brother    • No Known Problems Daughter    • Diabetes Maternal Grandmother    • Colon cancer Paternal Grandmother    • Breast cancer Maternal Aunt 58   • No Known Problems Paternal Aunt    • Substance Abuse Neg Hx    • Mental illness Neg Hx       Review of Systems   Respiratory: Negative. Cardiovascular: Negative.     Gastrointestinal: Negative for constipation and diarrhea. O:  Blood pressure 144/82, height 5' 4" (1.626 m), weight 102 kg (225 lb 6.4 oz), last menstrual period 08/21/2023. Patient appears well and is not in distress  Breasts are symmetrical without mass, tenderness, nipple discharge, skin changes or adenopathy. Abdomen is soft and nontender without masses. External genitals are normal without lesions or rashes. Urethral meatus and urethra are normal  Bladder is normal to palpation  Vagina is normal without discharge or bleeding. Cervix is normal without discharge or lesion. Uterus is normal, mobile, nontender without palpable mass. Adnexa are normal, nontender, without palpable mass. A:  Yearly exam, Perimenopause. P:   Pap & HPV up to date    Mammo - has active order     We discussed her age, OCP use and elevated BPs. I have advised she should stop her combined OCP at this time due to increase stroke/clot risk due to above factors. We discussed options to consider:  Just watching/tracking her cycles as I expect she is perimenopausal- and using condoms for contraception versus changing to progesterone only method. We will check [address] and estradiol off of her OCPs for at least two weeks. For now she will track/monitor and let me know how things go. RTO one year for yearly exam or sooner as needed.

## 2023-09-23 DIAGNOSIS — Z30.41 ORAL CONTRACEPTIVE USE: ICD-10-CM

## 2023-09-24 RX ORDER — NORGESTREL AND ETHINYL ESTRADIOL 0.3-0.03MG
KIT ORAL
Qty: 84 TABLET | Refills: 0 | Status: SHIPPED | OUTPATIENT
Start: 2023-09-24

## 2023-11-07 NOTE — TELEPHONE ENCOUNTER
Chief Complaint   Patient presents with   • Office Visit   • Follow-up   • Results     Consulting Physician : Ifeoma Patel DO    INVESTIGATIONS:  EGD/EUS: (11/6/2023)  Findings:  ENDOSCOPIC FINDING:  - LA Grade A (one or more mucosal breaks less than 5 mm, not extending between tops of 2 mucosal folds) esophagitis with no bleeding was found 39 cm from the incisors. Biopsies were taken with a cold forceps for histology.  - Patchy mildly erythematous mucosa without bleeding was found in the gastric antrum. Biopsies were taken with a cold forceps for histology.  - The examined duodenum was normal.  EUS (Stations in esophagus, stomach and duodenum visualized):-  - An irregular mass was identified in the pancreatic tail. The mass was hypoechoic and calcified. The mass measured 23 mm by 21 mm in maximal cross-sectional diameter. The endosonographic borders were poorly-defined. The remainder of the pancreas was examined. The endosonographic appearance of parenchyma and the upstream pancreatic duct  indicated no duct dilation, a maximum duct diameter of 2 mm and that the remainder of the pancreas was unremarkable. Fine needle aspiration for cytology was performed. Color Doppler imaging was utilized prior to needle puncture to  confirm a lack of significant vascular structures within the needle path. Two passes were made with the 22 gauge needle using a transgastric approach. A stylet was used. A cytologist was present and performed a preliminary cytologic  examination. Preliminary cytology is suspicious for adenocarcinoma (final results are pending).  - There was no sign of significant endosonographic abnormality in the common bile duct. The maximum diameter of the duct was 6 mm. No stones, no biliary sludge, ducts of normal caliber and ducts with regular contour were identified.  - No lymph nodes were visualized in the celiac region (level 20), perigastric region and peripancreatic region.  Impression:  - LA  Dr Chelle Jose,     I spoke to this patient a few weeks ago in regards to her bill  She is requesting for screening to be added to her procedure due to insurance purposes  However, she not only came in with GI issues, but she is also 41 with no direct family history of cancer, I believe her grandmother had been diagnosed with this at the age of 48  I spoke to our , and he had stated adding family hx of colon cancer to this procedure would more than likely not change her coverage considering she still came in primarily for constipation and IBS  Grade A reflux esophagitis with no bleeding. Rule out Kumar's esophagus. Biopsied.  - Erythematous mucosa in the antrum. Biopsied.  - Normal examined duodenum.  - A mass was identified in the pancreatic tail. Fine needle aspiration performed.  - There was no sign of significant pathology in the common bile duct.  - No lymph nodes were visualized in the celiac region (level 20), perigastric region and peripancreatic region.  Recommendations:  - Await cytology results and await path results.    Pathology:  A.   Antral and body biopsies:  -Antral and oxyntic mucosa with mild to focal moderate chronic gastritis, negative for activity  -H. pylori negative by routine stain     B.   GE junction biopsies:  -Mild to moderate chronic esophagogastritis, negative for intestinal metaplasia    ASSESSMENT/RECOMMENDATIONS:  Unintended weight loss  40mg of Pantoprazole twice a day (on empty stomach)  Ensure daily  Monitor weight daily    Call me in 2 weeks with symptoms up date  1. Pain without food (scale of 1-10)  2. Pain with food  3. Weight  4. Pacemaker. Can you have MRI's?    Follow-up in 3 months      Pancreatic lesion  Lipase and  in the lab next week  Await pathology results    Anemia  CBC, ferritin, iron panel in the lab next week    Abdominal pain, epigastric  Increase 40mg of Pantoprazole twice a day   Take Tums as needed for breakthrough  Will evaluate with EGD    Anti-reflux measures  Recommend weight loss with gradual exercise   Head end elevation  Avoid dietary triggers such as fatty foods, caffeine, chocolate, spicy foods, carbonated beverages, peppermint, red sauce  Avoid tobacco and alcohol   Abdominal breathing exercises    Personal history of colonic polyps  Recommended high fiber diet and metamucil daily  Increase water intake-64 Oz of water       Return in about 3 months (around  2/7/2024).  ------------------------------------------------------------------------------------------------------------  HPI:  Pleasant 72-year-old male here today for follow-up after EGD and EUS.  On EUS there was an ill-defined pancreatic lesion in the pancreatic tail 2.3 cm x 2.1 cm.  Preliminary evaluation by bedside pathologist was suspicious for adenocarcinoma however final results are pending.  He is reporting pain in the left upper quadrant which radiates through his back.  Pain is a dull ache.  It is aggravated by food and lying down.  He reports unintended weight loss of 10 pounds.  His appetite is okay but he does have increased pain with eating, so he is trying to limit his intake. He also reports increased belching.     EGD was also done for evaluation of gastric intestinal metaplasia and Kumar's esophagus.  No metaplasia was seen in the esophagus or stomach.  He is compliant with 40 mg of pantoprazole daily. History of smoking. Denies family history of GI cancer.    Problem   Gastric Intestinal Metaplasia    EGD: (11/2023)  Antral and body biopsies:  -Antral and oxyntic mucosa with mild to focal moderate chronic gastritis, negative for activity  -H. pylori negative by routine stain    EGD: (12/2022)  Gastric biopsy:  -Complete intestinal metaplasia arising in a background of mild chronic gastritis  -Negative for dysplasia  -H. pylori negative by routine stain     Abdominal Pain, Epigastric    Began October 1st--getting worse every day  -LUQ pain constant 4/10, sharp pain  -Pain is increased by eating make it go up to a 6/10 pain  -Better with distraction  -Lying down is worse. Difficulty sleeping    Taking 40mg of Pantoprazole daily     Unintended Weight Loss      Wt Readings from Last 4 Encounters:   11/07/23 76.2 kg (168 lb)   11/06/23 76.9 kg (169 lb 8.5 oz)   08/01/23 81.6 kg (180 lb)   07/11/23 81.6 kg (180 lb)     Appetite is okay   When he eats he has pain  Occurs with anything he eats. No  dependant on the type of food.    Began October 1st--getting worse every day  -LUQ pain constant 4/10, sharp pain  -Pain is increased by eating make it go up to a 6/10 pain  -Better with distraction  -Lying down is worse. Difficulty sleeping.    Breakfast: oatmeal and coffee--increased pain  Lunch: chicken soup and cracker--increased pain  Dinner: Split pea soup and salad, water--increased pain     Pancreatic Lesion    EGD/EUS: (11/6/2023)  An irregular mass was identified in the pancreatic tail. The mass was hypoechoic and calcified. The mass measured 23 mm by 21 mm in maximal cross-sectional diameter. The endosonographic borders were poorly-defined.     A cytologist was present and performed a preliminary cytologic examination. Preliminary cytology is suspicious for adenocarcinoma (final results are pending).    MRI: (6/2020)  The pancreas enhances homogeneously with no focal pancreatic lesion.  There is no pancreatic duct dilation and there is no peripancreatic fluid or peripancreatic inflammation     Anemia    Mild anemia on last labs (10/27- hgb 12.9)  Denies active bleeding including rectal bleeding or black stool  Reports fatigue and weakness  Denies dizziness or SOB     Personal History of Colonic Polyps    Last colonoscopy- 2022      Last colonoscopy in 2017  3 polyps removed-1 tubular adenoma  Recommended f/u colonoscopy in 5 years    Denies family history of colon cancer, abdominal pain, constipation,  diarrhea, rectal bleeding, or unexplained weight loss.     Kumar's Esophagus Without Dysplasia    EGD: (11/2023)  GE junction biopsies:  -Mild to moderate chronic esophagogastritis, negative for intestinal metaplasia      EGD: (2022)  Esophageal biopsies:  -Gastric mucosa with focal complete intestinal metaplasia, negative for dysplasia  -Mild chronic esophagogastritis, negative for intestinal metaplasia (See comment).    9/2022  Pt reports occasional reflux into throat. One time every 2-3 months. Taking  PPI daily.  Avoiding triggers-fried foods, lunch meat. Denies dysphagia/odynophagia.       Review of Systems   Constitutional: Positive for fatigue and unexpected weight change. Negative for activity change, appetite change, chills, diaphoresis and fever.   HENT: Negative for trouble swallowing and voice change.    Cardiovascular: Negative for chest pain.   Gastrointestinal: Positive for abdominal pain and nausea. Negative for abdominal distention, anal bleeding, blood in stool, constipation, diarrhea, rectal pain and vomiting.        No heartburn or reflux  Increased belching  LUQ pain that goes through the back   Skin: Negative for color change and pallor.   Neurological: Positive for weakness. Negative for dizziness and light-headedness.   Psychiatric/Behavioral: Negative for behavioral problems and confusion.     Past Medical History:   Patient Active Problem List   Diagnosis   • (HFpEF) heart failure with preserved ejection fraction (CMD)   • Benign essential HTN   • Diastolic dysfunction   • History of pancreatitis   • HLD (hyperlipidemia)   • Personal history of smoking   • Typical atrial flutter (CMD)   • Kumar's esophagus without dysplasia   • Status post total replacement of right hip   • Hiatal hernia   • Diverticulosis of large intestine without perforation or abscess without bleeding   • Hemorrhoids   • Personal history of colonic polyps   • Abdominal pain, epigastric   • Unintended weight loss   • Pancreatic lesion   • Anemia   • Gastric intestinal metaplasia       Past Surgical History:  Past Surgical History:   Procedure Laterality Date   • Appendectomy     • Back surgery      2 discectomy   • Carpal tunnel release Right    • Cholecystectomy     • Hernia repair Bilateral 04/07/2023    bilateal DANIAL ing hernia   • Total hip replacement Right    • Upper gastrointestinal endoscopy       Allergies:  ALLERGIES:  Patient has no known allergies.    Medications:   Current Outpatient Medications    Medication Sig Dispense Refill   • amoxicillin (AMOXIL) 500 MG tablet TAKE 4 TABLETS BY MOUTH 1 HOUR PRIOR TO DENTAL TREATMENT     • atorvastatin (LIPITOR) 40 MG tablet Take 1 tablet by mouth daily. 90 tablet 3   • lisinopril (ZESTRIL) 20 MG tablet Take 1 tablet by mouth daily. 90 tablet 3   • tamsulosin (FLOMAX) 0.4 MG Cap Take 1 capsule by mouth daily after a meal. Nightly 90 capsule 3   • pantoprazole (PROTONIX) 40 MG tablet Take 1 tablet by mouth daily. 90 tablet 3   • MULTIPLE VITAMIN PO Take 1 tablet by mouth daily.        No current facility-administered medications for this visit.      Social History:    Social History     Socioeconomic History   • Marital status: /Civil Union     Spouse name: Not on file   • Number of children: Not on file   • Years of education: Not on file   • Highest education level: Not on file   Occupational History   • Not on file   Tobacco Use   • Smoking status: Former     Current packs/day: 0.00     Average packs/day: 1 pack/day for 37.0 years (37.0 ttl pk-yrs)     Types: Cigarettes     Start date: 1974     Quit date: 2010     Years since quittin.8   • Smokeless tobacco: Never   Vaping Use   • Vaping Use: never used   Substance and Sexual Activity   • Alcohol use: Yes     Comment: rare   • Drug use: Never   • Sexual activity: Not on file   Other Topics Concern   • Not on file   Social History Narrative    SURGICAL HISTORY:    The patient has had the following surgeries: discectomy x 2, carpal tunnel R, appendectomy, cholecystectomy, cardiac ablation         FAMILY HISTORY:    Father:  at age 76 of heart disease      Mother:  at age 84 of heart disease      Sibs: 5 - Has the following health concerns: brother -  at age 71 of bone CA; brother - prostate CA; 2 sisters - one with aortic disease, 1 brother - healthy    Children: 2 - Has the following health concerns: son, daughter - healthy         SOCIAL HISTORY:    Work/Activites: employed -  driving a bus    Marital Status:     Smoking: was a 1 pkg/day for 20 years and quit 7+ years ago      Does patient smoke: No    Alcohol: DRINKS: occasionally in social situations      Drugs: denies    Lives with spouse.      Exercise:exercises sporadically     Social Determinants of Health     Financial Resource Strain: Not on file   Food Insecurity: Not on file   Transportation Needs: Not on file   Physical Activity: Not on file   Stress: Not on file   Social Connections: Not on file   Intimate Partner Violence: Not At Risk (11/6/2023)    Intimate Partner Violence    • Social Determinants: Intimate Partner Violence Past Fear: No    • Social Determinants: Intimate Partner Violence Current Fear: No       Family History:   Family History   Problem Relation Age of Onset   • Heart disease Mother    • Heart disease Father    • Diabetes Sister    • Heart Sister    • Bone cancer Brother    • Cancer, Prostate Brother        Visit Vitals  Ht 5' 8\" (1.727 m)   Wt 76.2 kg (168 lb)   BMI 25.54 kg/m²     Physical Exam  Constitutional:       Appearance: Normal appearance.   HENT:      Mouth/Throat:      Mouth: Mucous membranes are moist.   Cardiovascular:      Rate and Rhythm: Normal rate and regular rhythm.      Heart sounds: Normal heart sounds.   Pulmonary:      Effort: Pulmonary effort is normal.   Abdominal:      General: Bowel sounds are normal. There is no distension.      Palpations: Abdomen is soft.      Tenderness: There is no abdominal tenderness.   Skin:     General: Skin is warm.   Neurological:      Mental Status: He is alert and oriented to person, place, and time.   Psychiatric:         Mood and Affect: Mood normal.         Behavior: Behavior normal.         Labs:   Lab Results   Component Value Date    WBC 4.6 11/14/2023    HGB 13.6 11/14/2023    HCT 40.5 11/14/2023     11/14/2023    .2 (H) 11/14/2023    MCV 97.2 02/07/2019     Lab Results   Component Value Date    CO2 28 10/27/2023    BUN 10  10/27/2023    GLUCOSE 124 (H) 10/27/2023    GLUCOSE 92 02/06/2019     Lab Results   Component Value Date    AST 15 10/27/2023     No results found for: \"PT\", \"INR\"  Lab Results   Component Value Date    TIBC 395 11/14/2023    IRON 92 11/14/2023     Lab Results   Component Value Date    TSH 1.188 02/02/2023     No components found for: \"A1C\"    Tash Jimenez, CNP    Copy of the note has been sent to Ifeoma Patel DO

## 2023-12-26 ENCOUNTER — APPOINTMENT (OUTPATIENT)
Dept: LAB | Facility: CLINIC | Age: 53
End: 2023-12-26
Payer: COMMERCIAL

## 2023-12-26 DIAGNOSIS — N95.1 MENOPAUSAL SYMPTOMS: ICD-10-CM

## 2023-12-26 DIAGNOSIS — Z13.29 SCREENING FOR THYROID DISORDER: ICD-10-CM

## 2023-12-26 DIAGNOSIS — Z13.0 SCREENING FOR BLOOD DISEASE: ICD-10-CM

## 2023-12-26 DIAGNOSIS — Z13.228 SCREENING FOR METABOLIC DISORDER: ICD-10-CM

## 2023-12-26 DIAGNOSIS — Z13.220 SCREENING FOR CHOLESTEROL LEVEL: ICD-10-CM

## 2023-12-26 DIAGNOSIS — N95.1 PERIMENOPAUSE: ICD-10-CM

## 2023-12-26 DIAGNOSIS — Z13.1 SCREENING FOR DIABETES MELLITUS: ICD-10-CM

## 2023-12-26 LAB
ALBUMIN SERPL BCP-MCNC: 4.3 G/DL (ref 3.5–5)
ALP SERPL-CCNC: 55 U/L (ref 34–104)
ALT SERPL W P-5'-P-CCNC: 36 U/L (ref 7–52)
ANION GAP SERPL CALCULATED.3IONS-SCNC: 9 MMOL/L
AST SERPL W P-5'-P-CCNC: 25 U/L (ref 13–39)
BASOPHILS # BLD AUTO: 0.07 THOUSANDS/ÂΜL (ref 0–0.1)
BASOPHILS NFR BLD AUTO: 1 % (ref 0–1)
BILIRUB SERPL-MCNC: 0.43 MG/DL (ref 0.2–1)
BUN SERPL-MCNC: 18 MG/DL (ref 5–25)
CALCIUM SERPL-MCNC: 9.3 MG/DL (ref 8.4–10.2)
CHLORIDE SERPL-SCNC: 101 MMOL/L (ref 96–108)
CHOLEST SERPL-MCNC: 238 MG/DL
CO2 SERPL-SCNC: 29 MMOL/L (ref 21–32)
CREAT SERPL-MCNC: 0.64 MG/DL (ref 0.6–1.3)
EOSINOPHIL # BLD AUTO: 0.2 THOUSAND/ÂΜL (ref 0–0.61)
EOSINOPHIL NFR BLD AUTO: 3 % (ref 0–6)
ERYTHROCYTE [DISTWIDTH] IN BLOOD BY AUTOMATED COUNT: 12.9 % (ref 11.6–15.1)
ESTRADIOL SERPL-MCNC: 17.1 PG/ML
FSH SERPL-ACNC: 49.2 MIU/ML
GFR SERPL CREATININE-BSD FRML MDRD: 103 ML/MIN/1.73SQ M
GLUCOSE P FAST SERPL-MCNC: 81 MG/DL (ref 65–99)
HCT VFR BLD AUTO: 39.3 % (ref 34.8–46.1)
HDLC SERPL-MCNC: 79 MG/DL
HGB BLD-MCNC: 13.4 G/DL (ref 11.5–15.4)
IMM GRANULOCYTES # BLD AUTO: 0.05 THOUSAND/UL (ref 0–0.2)
IMM GRANULOCYTES NFR BLD AUTO: 1 % (ref 0–2)
LDLC SERPL CALC-MCNC: 128 MG/DL (ref 0–100)
LYMPHOCYTES # BLD AUTO: 3.66 THOUSANDS/ÂΜL (ref 0.6–4.47)
LYMPHOCYTES NFR BLD AUTO: 48 % (ref 14–44)
MCH RBC QN AUTO: 29.6 PG (ref 26.8–34.3)
MCHC RBC AUTO-ENTMCNC: 34.1 G/DL (ref 31.4–37.4)
MCV RBC AUTO: 87 FL (ref 82–98)
MONOCYTES # BLD AUTO: 0.44 THOUSAND/ÂΜL (ref 0.17–1.22)
MONOCYTES NFR BLD AUTO: 6 % (ref 4–12)
NEUTROPHILS # BLD AUTO: 3.09 THOUSANDS/ÂΜL (ref 1.85–7.62)
NEUTS SEG NFR BLD AUTO: 41 % (ref 43–75)
NRBC BLD AUTO-RTO: 0 /100 WBCS
PLATELET # BLD AUTO: 261 THOUSANDS/UL (ref 149–390)
PMV BLD AUTO: 11.2 FL (ref 8.9–12.7)
POTASSIUM SERPL-SCNC: 3.8 MMOL/L (ref 3.5–5.3)
PROT SERPL-MCNC: 7.4 G/DL (ref 6.4–8.4)
RBC # BLD AUTO: 4.52 MILLION/UL (ref 3.81–5.12)
SODIUM SERPL-SCNC: 139 MMOL/L (ref 135–147)
TRIGL SERPL-MCNC: 156 MG/DL
TSH SERPL DL<=0.05 MIU/L-ACNC: 3.15 UIU/ML (ref 0.45–4.5)
WBC # BLD AUTO: 7.51 THOUSAND/UL (ref 4.31–10.16)

## 2023-12-26 PROCEDURE — 82670 ASSAY OF TOTAL ESTRADIOL: CPT

## 2023-12-26 PROCEDURE — 80053 COMPREHEN METABOLIC PANEL: CPT

## 2023-12-26 PROCEDURE — 85025 COMPLETE CBC W/AUTO DIFF WBC: CPT

## 2023-12-26 PROCEDURE — 84443 ASSAY THYROID STIM HORMONE: CPT

## 2023-12-26 PROCEDURE — 80061 LIPID PANEL: CPT

## 2023-12-26 PROCEDURE — 83001 ASSAY OF GONADOTROPIN (FSH): CPT

## 2023-12-26 PROCEDURE — 36415 COLL VENOUS BLD VENIPUNCTURE: CPT

## 2023-12-28 ENCOUNTER — TELEPHONE (OUTPATIENT)
Dept: FAMILY MEDICINE CLINIC | Facility: CLINIC | Age: 53
End: 2023-12-28

## 2023-12-28 NOTE — TELEPHONE ENCOUNTER
Please let patient know that her lab work showed cholesterol is mildly elevated, other lab results were all good. I recommend for patient to work on healthy diet and increase exercise.

## 2023-12-29 ENCOUNTER — TELEPHONE (OUTPATIENT)
Age: 53
End: 2023-12-29

## 2023-12-29 NOTE — TELEPHONE ENCOUNTER
----- Message from Lilly Dennis MD sent at 12/28/2023  4:01 PM EST -----  Labs are consistent with menopause.  Did she wait at least two weeks off of OCPs to have these done?  If so does not need to resume.

## 2024-04-17 ENCOUNTER — VBI (OUTPATIENT)
Dept: ADMINISTRATIVE | Facility: OTHER | Age: 54
End: 2024-04-17

## 2024-05-24 NOTE — PROGRESS NOTES
Patient returned from EDMUNDO put on monitor. Sleepy but arousable. Assessment and VS as charted.  at the bedside.   cetirizine (ZYRTEC ALLERGY) 10 mg tablet 10 mg, Oral, Daily             Summary: Take 10 mg by mouth daily   Dose, Frequency: 10 mg, Daily  Ord/Sold: 2/19/2018 (O)  Pharmacy: [facility]/pharmacy #[id number], [address] [place] [address]  Report  Long-term:   Med Dose History       Patient Sig: Take 10 mg by mouth daily       Ordered on: 2/19/2018       Authorized by: Jerilyn Cooper

## 2024-07-07 ENCOUNTER — RA CDI HCC (OUTPATIENT)
Dept: OTHER | Facility: HOSPITAL | Age: 54
End: 2024-07-07

## 2024-07-19 ENCOUNTER — OFFICE VISIT (OUTPATIENT)
Dept: FAMILY MEDICINE CLINIC | Facility: CLINIC | Age: 54
End: 2024-07-19
Payer: COMMERCIAL

## 2024-07-19 VITALS
BODY MASS INDEX: 39.44 KG/M2 | TEMPERATURE: 97.5 F | HEIGHT: 64 IN | HEART RATE: 95 BPM | DIASTOLIC BLOOD PRESSURE: 88 MMHG | RESPIRATION RATE: 15 BRPM | SYSTOLIC BLOOD PRESSURE: 136 MMHG | WEIGHT: 231 LBS | OXYGEN SATURATION: 96 %

## 2024-07-19 DIAGNOSIS — Z13.89 SCREENING FOR BLOOD OR PROTEIN IN URINE: ICD-10-CM

## 2024-07-19 DIAGNOSIS — Z13.220 SCREENING FOR CHOLESTEROL LEVEL: ICD-10-CM

## 2024-07-19 DIAGNOSIS — R63.5 WEIGHT GAIN: ICD-10-CM

## 2024-07-19 DIAGNOSIS — Z00.00 ANNUAL PHYSICAL EXAM: Primary | ICD-10-CM

## 2024-07-19 DIAGNOSIS — Z13.228 SCREENING FOR METABOLIC DISORDER: ICD-10-CM

## 2024-07-19 DIAGNOSIS — E66.9 OBESITY WITH BODY MASS INDEX (BMI) OF 35.0 TO 39.9 WITHOUT COMORBIDITY: ICD-10-CM

## 2024-07-19 DIAGNOSIS — Z13.29 SCREENING FOR THYROID DISORDER: ICD-10-CM

## 2024-07-19 DIAGNOSIS — Z13.1 SCREENING FOR DIABETES MELLITUS: ICD-10-CM

## 2024-07-19 DIAGNOSIS — Z13.0 SCREENING FOR BLOOD DISEASE: ICD-10-CM

## 2024-07-19 DIAGNOSIS — E55.9 VITAMIN D DEFICIENCY: ICD-10-CM

## 2024-07-19 DIAGNOSIS — Z12.31 ENCOUNTER FOR SCREENING MAMMOGRAM FOR BREAST CANCER: ICD-10-CM

## 2024-07-19 PROCEDURE — 99396 PREV VISIT EST AGE 40-64: CPT | Performed by: FAMILY MEDICINE

## 2024-07-19 NOTE — PATIENT INSTRUCTIONS
"Patient Education     Routine physical for adults   The Basics   Written by the doctors and editors at [facility]   What is a physical? -- A physical is a routine visit, or \"check-up,\" with your doctor. You might also hear it called a \"wellness visit\" or \"preventive visit.\"  During each visit, the doctor will:   Ask about your physical and mental health   Ask about your habits, behaviors, and lifestyle   Do an exam   Give you vaccines if needed   Talk to you about any medicines you take   Give advice about your health   Answer your questions  Getting regular check-ups is an important part of taking care of your health. It can help your doctor find and treat any problems you have. But it's also important for preventing health problems.  A routine physical is different from a \"sick visit.\" A sick visit is when you see a doctor because of a health concern or problem. Since physicals are scheduled ahead of time, you can think about what you want to ask the doctor.  How often should I get a physical? -- It depends on your age and health. In general, for people age 21 years and older:   If you are younger than 50 years, you might be able to get a physical every 3 years.   If you are 50 years or older, your doctor might recommend a physical every year.  If you have an ongoing health condition, like diabetes or high blood pressure, your doctor will probably want to see you more often.  What happens during a physical? -- In general, each visit will include:   Physical exam - The doctor or nurse will check your height, weight, heart rate, and blood pressure. They will also look at your eyes and ears. They will ask about how you are feeling and whether you have any symptoms that bother you.   Medicines - It's a good idea to bring a list of all the medicines you take to each doctor visit. Your doctor will talk to you about your medicines and answer any questions. Tell them if you are having any side effects that bother you. You " "should also tell them if you are having trouble paying for any of your medicines.   Habits and behaviors - This includes:   Your diet   Your exercise habits   Whether you smoke, drink alcohol, or use drugs   Whether you are sexually active   Whether you feel safe at home  Your doctor will talk to you about things you can do to improve your health and lower your risk of health problems. They will also offer help and support. For example, if you want to quit smoking, they can give you advice and might prescribe medicines. If you want to improve your diet or get more physical activity, they can help you with this, too.   Lab tests, if needed - The tests you get will depend on your age and situation. For example, your doctor might want to check your:   Cholesterol   Blood sugar   Iron level   Vaccines - The recommended vaccines will depend on your age, health, and what vaccines you already had. Vaccines are very important because they can prevent certain serious or deadly infections.   Discussion of screening - \"Screening\" means checking for diseases or other health problems before they cause symptoms. Your doctor can recommend screening based on your age, risk, and preferences. This might include tests to check for:   Cancer, such as breast, prostate, cervical, ovarian, colorectal, prostate, lung, or skin cancer   Sexually transmitted infections, such as chlamydia and gonorrhea   Mental health conditions like depression and anxiety  Your doctor will talk to you about the different types of screening tests. They can help you decide which screenings to have. They can also explain what the results might mean.   Answering questions - The physical is a good time to ask the doctor or nurse questions about your health. If needed, they can refer you to other doctors or specialists, too.  Adults older than 65 years often need other care, too. As you get older, your doctor will talk to you about:   How to prevent falling at " home   Hearing or vision tests   Memory testing   How to take your medicines safely   Making sure that you have the help and support you need at home  All topics are updated as new evidence becomes available and our peer review process is complete.  This topic retrieved from [employer] on: May 02, 2024.  Topic 906606 Version 1.0  Release: 32.4.3 - C32.122  © 2024 UpToDate, Inc. and/or its affiliates. All rights reserved.  Consumer Information Use and Disclaimer   Disclaimer: This generalized information is a limited summary of diagnosis, treatment, and/or medication information. It is not meant to be comprehensive and should be used as a tool to help the user understand and/or assess potential diagnostic and treatment options. It does NOT include all information about conditions, treatments, medications, side effects, or risks that may apply to a specific patient. It is not intended to be medical advice or a substitute for the medical advice, diagnosis, or treatment of a health care provider based on the health care provider's examination and assessment of a patient's specific and unique circumstances. Patients must speak with a health care provider for complete information about their health, medical questions, and treatment options, including any risks or benefits regarding use of medications. This information does not endorse any treatments or medications as safe, effective, or approved for treating a specific patient. UpToDate, Inc. and its affiliates disclaim any warranty or liability relating to this information or the use thereof.The use of this information is governed by the Terms of Use, available at https://www.woltersTruantTodayuwer.com/en/know/clinical-effectiveness-terms. 2024© UpToDate, Inc. and its affiliates and/or licensors. All rights reserved.  Copyright   © 2024 UpToDate, Inc. and/or its affiliates. All rights reserved.

## 2024-07-19 NOTE — PROGRESS NOTES
Adult Annual Physical  Name: Fátima Barlow      : 1970      MRN: [id number]  Encounter Provider: Julita Flannery MD  Encounter Date: 2024   Encounter department: [facility]    Assessment & Plan     Annual physical exam  - screening labs and mammogram ordered       - Mammo screening bilateral w 3d & cad       - Lipid Panel with Direct LDL reflex       - CBC and differential       - Comprehensive metabolic panel       - UA (URINE) with reflex to Scope       - TSH, 3rd generation with Free T4 reflex       - Vitamin D 25 hydroxy    Weight gain/ obesity  - advised to work on healthy diet and regular exercise, Weight management referral provided       - Ambulatory Referral to Weight Management    Elevated blood pressure reading  - discussed low sodium diet and life style changes       Immunizations and preventive care screenings were discussed with patient today. Appropriate education was printed on patient's after visit summary.    Counseling:  Alcohol/drug use: discussed moderation in alcohol intake, the recommendations for healthy alcohol use, and avoidance of illicit drug use.  Dental Health: discussed importance of regular tooth brushing, flossing, and dental visits.  Injury prevention: discussed safety/seat belts, safety helmets, smoke detectors, carbon dioxide detectors, and smoking near bedding or upholstery.  Exercise: the importance of regular exercise/physical activity was discussed. Recommend exercise 3-5 times per week for at least 30 minutes.          Adult Annual Physical:  Patient presents for annual physical.     Diet and Physical Activity:  - Diet/Nutrition: well balanced diet.  - Exercise: walking and 3-4 times a week on average.    Depression Screening:  - PHQ-2 Score: 0    General Health:  - Sleep: sleeps well and 7-8 hours of sleep on average.  - Hearing: normal hearing bilateral ears.  - Vision: goes for regular eye exams and wears  "glasses.  - Dental: regular dental visits.    /GYN Health:  - Follows with GYN: yes.   - Menopause: perimenopausal.     Review of Systems   Constitutional:  Negative for appetite change, chills, fatigue, fever and unexpected weight change.   HENT:  Negative for congestion, ear pain, rhinorrhea and sore throat.    Eyes:  Negative for pain, discharge, redness, itching and visual disturbance.   Respiratory:  Negative for cough, shortness of breath and wheezing.    Cardiovascular:  Negative for chest pain, palpitations and leg swelling.   Gastrointestinal:  Negative for abdominal pain, blood in stool, constipation, diarrhea, nausea and vomiting.   Endocrine: Negative for cold intolerance, heat intolerance, polydipsia and polyuria.   Genitourinary:  Negative for dysuria, frequency, hematuria, pelvic pain, urgency and vaginal discharge.   Musculoskeletal:  Negative for back pain, gait problem, joint swelling and myalgias.   Skin:  Negative for rash.   Neurological:  Negative for dizziness, syncope, weakness, numbness and headaches.   Hematological:  Negative for adenopathy.   Psychiatric/Behavioral:  Negative for dysphoric mood and sleep disturbance. The patient is not nervous/anxious.          Objective     /88   Pulse 95   Temp 97.5 °F (36.4 °C)   Resp 15   Ht 5' 4\" (1.626 m)   Wt 105 kg (231 lb)   SpO2 96%   BMI 39.65 kg/m²     Physical Exam  Constitutional:       General: She is not in acute distress.     Appearance: She is well-developed.   HENT:      Head: Normocephalic and atraumatic.      Right Ear: Tympanic membrane, ear canal and external ear normal.      Left Ear: Tympanic membrane, ear canal and external ear normal.      Mouth/Throat:      Mouth: Mucous membranes are moist.      Pharynx: Oropharynx is clear.   Eyes:      General: No scleral icterus.     Extraocular Movements: Extraocular movements intact.      Conjunctiva/sclera: Conjunctivae normal.      Pupils: Pupils are equal, round, and " reactive to light.   Neck:      Thyroid: No thyromegaly.      Vascular: No JVD.   Cardiovascular:      Rate and Rhythm: Normal rate and regular rhythm.      Heart sounds: Normal heart sounds. No murmur heard.  Pulmonary:      Effort: Pulmonary effort is normal. No respiratory distress.      Breath sounds: Normal breath sounds. No wheezing, rhonchi or rales.   Abdominal:      General: There is no distension.      Palpations: Abdomen is soft. There is no mass.      Tenderness: There is no abdominal tenderness.   Musculoskeletal:      Cervical back: Neck supple.      Right lower leg: No edema.      Left lower leg: No edema.   Lymphadenopathy:      Cervical: No cervical adenopathy.   Skin:     Findings: No rash.   Neurological:      General: No focal deficit present.      Mental Status: She is alert and oriented to person, place, and time.      Cranial Nerves: No cranial nerve deficit.   Psychiatric:         Mood and Affect: Mood normal.         Behavior: Behavior normal.         Thought Content: Thought content normal.         Judgment: Judgment normal.       Lab Results   Component Value Date    CHOLESTEROL 238 (H) 12/26/2023    CHOLESTEROL 216 (H) 06/17/2022    CHOLESTEROL 218 (H) 12/20/2019     Lab Results   Component Value Date    HDL 79 12/26/2023    HDL 64 06/17/2022    HDL 73 12/20/2019     Lab Results   Component Value Date    TRIG 156 (H) 12/26/2023    TRIG 144 06/17/2022    TRIG 140 12/20/2019     Lab Results   Component Value Date    LDLCALC 128 (H) 12/26/2023     Lab Results   Component Value Date     09/28/2015    SODIUM 139 12/26/2023    K 3.8 12/26/2023     12/26/2023    CO2 29 12/26/2023    ANIONGAP 5 09/28/2015    AGAP 9 12/26/2023    BUN 18 12/26/2023    CREATININE 0.64 12/26/2023    GLUC 93 08/04/2023    GLUF 81 12/26/2023    CALCIUM 9.3 12/26/2023    AST 25 12/26/2023    ALT 36 12/26/2023    ALKPHOS 55 12/26/2023    PROT 7.1 09/28/2015    TP 7.4 12/26/2023    BILITOT 0.38 09/28/2015     TBILI 0.43 12/26/2023    EGFR 103 12/26/2023     Lab Results   Component Value Date    XVY2AGKPQYMW 3.150 12/26/2023     Lab Results   Component Value Date    WBC 7.51 12/26/2023    HGB 13.4 12/26/2023    HCT 39.3 12/26/2023    MCV 87 12/26/2023     12/26/2023

## 2024-08-06 ENCOUNTER — APPOINTMENT (OUTPATIENT)
Dept: RADIOLOGY | Facility: CLINIC | Age: 54
End: 2024-08-06
Payer: COMMERCIAL

## 2024-08-06 ENCOUNTER — OFFICE VISIT (OUTPATIENT)
Dept: FAMILY MEDICINE CLINIC | Facility: CLINIC | Age: 54
End: 2024-08-06
Payer: COMMERCIAL

## 2024-08-06 ENCOUNTER — EVALUATION (OUTPATIENT)
Dept: PHYSICAL THERAPY | Facility: CLINIC | Age: 54
End: 2024-08-06
Payer: COMMERCIAL

## 2024-08-06 VITALS
SYSTOLIC BLOOD PRESSURE: 132 MMHG | WEIGHT: 231.4 LBS | HEART RATE: 101 BPM | DIASTOLIC BLOOD PRESSURE: 88 MMHG | HEIGHT: 64 IN | RESPIRATION RATE: 15 BRPM | OXYGEN SATURATION: 97 % | BODY MASS INDEX: 39.5 KG/M2 | TEMPERATURE: 97.5 F

## 2024-08-06 DIAGNOSIS — S46.912A STRAIN OF LEFT SHOULDER, INITIAL ENCOUNTER: Primary | ICD-10-CM

## 2024-08-06 DIAGNOSIS — S46.912A STRAIN OF LEFT SHOULDER, INITIAL ENCOUNTER: ICD-10-CM

## 2024-08-06 PROCEDURE — 97161 PT EVAL LOW COMPLEX 20 MIN: CPT | Performed by: PHYSICAL THERAPIST

## 2024-08-06 PROCEDURE — 73030 X-RAY EXAM OF SHOULDER: CPT

## 2024-08-06 PROCEDURE — 97110 THERAPEUTIC EXERCISES: CPT | Performed by: PHYSICAL THERAPIST

## 2024-08-06 PROCEDURE — 99213 OFFICE O/P EST LOW 20 MIN: CPT | Performed by: STUDENT IN AN ORGANIZED HEALTH CARE EDUCATION/TRAINING PROGRAM

## 2024-08-06 RX ORDER — NAPROXEN 500 MG/1
500 TABLET ORAL 2 TIMES DAILY WITH MEALS
Qty: 14 TABLET | Refills: 0 | Status: SHIPPED | OUTPATIENT
Start: 2024-08-06 | End: 2024-08-13

## 2024-08-06 NOTE — PROGRESS NOTES
PT Evaluation     Today's date: 2024  Patient name: Fátima Barlow  : 1970  MRN: [id number]  Referring provider: Mary Evangelista MD  Dx:   Encounter Diagnosis     ICD-10-CM    1. Strain of left shoulder, initial encounter  S46.912A Ambulatory Referral to Physical Therapy                     Assessment  Functional limitations: significantly limited functional use of LUE    Assessment details: Fátima Barlow is a 52 yo female with chronic left shoulder pain with acute exacerbation. Significantly limited ROM, strength, and functional use. Inferior capsule hypomobility. She is a good candidate for OPPT to address the above impairments and optimize functional status. Discussed with patient trying PT for 2-3 weeks with referral to orthopedics if no improvement is noted.     Goals  6-8 weeks  1. Independent with HEP at discharge  2. Tolerate sleeping 6-8 hours uninterrupted by pain to allow resuming PLOF  3. Normalize L shld strength throughout to allow full functional use of LUE.  4. Achieve full AROM L shoulder at all planes to allow full functional use of LUE.  5. Tolerate use of LUE for all ADLs to allow return to PLOF.        Plan  Patient would benefit from: PT eval and skilled physical therapy  Planned modality interventions: low level laser therapy    Planned therapy interventions: manual therapy, neuromuscular re-education, patient/caregiver education, therapeutic activities, therapeutic exercise and home exercise program    Frequency: 2x week  Duration in weeks: 8  Treatment plan discussed with: patient  Plan details: Provided with and reviewed initial written HEP.  Reviewed physical exam findings and plan of care.  All questions answered to patient's satisfaction.          Subjective Evaluation    History of Present Illness  Mechanism of injury: Patient reports a few days ago she started having left shoulder pain with no mechanism of injury. Limited lifting and reaching. Reports the shoulder has  been somewhat sore for the last 6-12 months. Went to her PCP, had xray, prescribed NSAID, and was referred to OPPT for evaluation.   Patient Goals  Patient goals for therapy: increased motion and decreased pain    Pain  Current pain ratin (no pain at rest)  Location: L shld    Hand dominance: left  Exercise history: walking    Treatments  No previous or current treatments        Objective     Cervical/Thoracic Screen   Cervical range of motion within normal limits  Cervical range of motion within normal limits with the following exceptions: No change in symptoms with AROM C/S all planes, gentle traction, or repeated retraction    Active Range of Motion   Left Shoulder   Flexion: 10 degrees   Abduction: 30 degrees     Passive Range of Motion   Left Shoulder   Flexion: 80 degrees with pain  Abduction: 140 degrees with pain  External rotation 90°: 10 degrees with pain  Internal rotation 90°: 60 degrees with pain    Joint Play   Left Shoulder  Joints within functional limits are the anterior capsule and inferior capsule. Hypomobile in the posterior capsule.             Precautions: standard      Manuals             PROM L shld             Inf jt mobs                                       Neuro Re-Ed                                                                                                        Ther Ex             Pulleys             Finger ladder if able             Table slides flex, abd,ER             UBE if able             Corner ER str                                       Pt ed HEP rev KT            Ther Activity                                       Gait Training                                       Modalities

## 2024-08-06 NOTE — PROGRESS NOTES
Ambulatory Visit  Name: Fátima Barlow      : 1970      MRN: [id number]  Encounter Provider: Mary Evangelista MD  Encounter Date: 2024   Encounter department: [facility]    Assessment & Plan   1. Strain of left shoulder, initial encounter  -     XR shoulder 2+ vw left; Future; Expected date: 2024  -     Ambulatory Referral to Physical Therapy; Future  -     naproxen (Naprosyn) 500 mg tablet; Take 1 tablet (500 mg total) by mouth 2 (two) times a day with meals for 7 days  Discussed with patient will order for x-ray imaging to evaluate  Naproxen for pain and inflammation to be taken with meals  Refer to physical therapy for further evaluation     Patient encouraged to follow-up if symptoms worsen or do not improve  History of Present Illness     Patient reports rotator cuff stretch, went to her a few years ago at which time patient was walking her dog and felt a pull.  However this time patient is unsure of any possible trauma.  Reports limited range of motion due to pain in left shoulder.  Patient denies any strenuous activity.  Patient states it started about last week and has continued to worsen during the time that this started patient does report she started a workout regimen with her  and went on a 3 mile run.  Denies any chest pain discomfort tightness difficulty breathing or change in vision.  Patient denies any tingling or numbness.  Patient is left-handed.  Has not taken anything for this.      Review of Systems   Constitutional:  Negative for appetite change.   HENT:  Negative for congestion.    Respiratory:  Negative for chest tightness and shortness of breath.    Cardiovascular:  Negative for chest pain.   Musculoskeletal:         Left shoulder pain   Neurological:  Negative for dizziness, light-headedness and headaches.     Past Medical History   History reviewed. No pertinent past medical history.  Past Surgical History:   Procedure  Laterality Date     SECTION      x 2    COLONOSCOPY N/A 2018    Procedure: COLONOSCOPY;  Surgeon: Charlotte Wilson DO;  Location: [facility] GI LAB;  Service: Gastroenterology     Family History   Problem Relation Age of Onset    No Known Problems Mother     Hypertension Father     COPD Father     Cancer Brother     No Known Problems Daughter     Diabetes Maternal Grandmother     Colon cancer Paternal Grandmother     Breast cancer Maternal Aunt 62    No Known Problems Paternal Aunt     Substance Abuse Neg Hx     Mental illness Neg Hx      Current Outpatient Medications on File Prior to Visit   Medication Sig Dispense Refill    albuterol (PROVENTIL HFA,VENTOLIN HFA) 90 mcg/act inhaler TAKE 2 PUFFS BY MOUTH EVERY 6 HOURS AS NEEDED FOR WHEEZE OR FOR SHORTNESS OF BREATH 8.5 g 3    cetirizine (ZyrTEC) 10 mg tablet Take 10 mg by mouth daily      fluticasone (FLONASE) 50 mcg/act nasal spray 2 sprays into each nostril daily 3 Bottle 1    Low-Ogestrel 0.3-30 MG-MCG per tablet TAKE 1 TABLET BY MOUTH EVERY DAY (Patient not taking: Reported on 2024) 84 tablet 0     No current facility-administered medications on file prior to visit.     Allergies   Allergen Reactions    Other Allergic Rhinitis     Seasonal       Current Outpatient Medications on File Prior to Visit   Medication Sig Dispense Refill    albuterol (PROVENTIL HFA,VENTOLIN HFA) 90 mcg/act inhaler TAKE 2 PUFFS BY MOUTH EVERY 6 HOURS AS NEEDED FOR WHEEZE OR FOR SHORTNESS OF BREATH 8.5 g 3    cetirizine (ZyrTEC) 10 mg tablet Take 10 mg by mouth daily      fluticasone (FLONASE) 50 mcg/act nasal spray 2 sprays into each nostril daily 3 Bottle 1    Low-Ogestrel 0.3-30 MG-MCG per tablet TAKE 1 TABLET BY MOUTH EVERY DAY (Patient not taking: Reported on 2024) 84 tablet 0     No current facility-administered medications on file prior to visit.      Social History     Tobacco Use    Smoking status: Never    Smokeless tobacco: Never   Vaping Use    Vaping  "status: Never Used   Substance and Sexual Activity    Alcohol use: Yes     Comment: occassionally    Drug use: No    Sexual activity: Yes     Partners: Male     Objective     /88   Pulse 101   Temp 97.5 °F (36.4 °C)   Resp 15   Ht 5' 4\" (1.626 m)   Wt 105 kg (231 lb 6.4 oz)   SpO2 97%   BMI 39.72 kg/m²     Physical Exam  Eyes:      Extraocular Movements: Extraocular movements intact.   Cardiovascular:      Rate and Rhythm: Normal rate and regular rhythm.      Pulses: Normal pulses.      Heart sounds: Normal heart sounds.   Pulmonary:      Effort: Pulmonary effort is normal.   Musculoskeletal:      Right shoulder: No deformity, effusion, tenderness or crepitus. Normal range of motion. Normal strength. Normal pulse.      Left shoulder: Tenderness present. No deformity, effusion or crepitus. Decreased range of motion. Normal strength. Normal pulse.   Neurological:      Mental Status: She is alert.   Psychiatric:         Mood and Affect: Mood normal.       Administrative Statements   I have spent a total time of 30 minutes in caring for this patient on the day of the visit/encounter including Instructions for management, Patient and family education, Impressions, Reviewing / ordering tests, medicine, procedures  , and Obtaining or reviewing history  .        "

## 2024-08-13 ENCOUNTER — APPOINTMENT (OUTPATIENT)
Dept: PHYSICAL THERAPY | Facility: CLINIC | Age: 54
End: 2024-08-13
Payer: COMMERCIAL

## 2024-08-15 ENCOUNTER — OFFICE VISIT (OUTPATIENT)
Dept: PHYSICAL THERAPY | Facility: CLINIC | Age: 54
End: 2024-08-15
Payer: COMMERCIAL

## 2024-08-15 DIAGNOSIS — S46.912A STRAIN OF LEFT SHOULDER, INITIAL ENCOUNTER: Primary | ICD-10-CM

## 2024-08-15 PROCEDURE — 97110 THERAPEUTIC EXERCISES: CPT | Performed by: PHYSICAL THERAPIST

## 2024-08-15 PROCEDURE — 97140 MANUAL THERAPY 1/> REGIONS: CPT | Performed by: PHYSICAL THERAPIST

## 2024-08-15 NOTE — PROGRESS NOTES
"Daily Note     Today's date: 8/15/2024  Patient name: Fátima Barlow  : 1970  MRN: [id number]  Referring provider: Mary Evangelista MD  Dx:   Encounter Diagnosis     ICD-10-CM    1. Strain of left shoulder, initial encounter  S46.912A                      Subjective: Pt reports feeling much better since last week. AROM in L shoulder WFL observed prior to tx. As per pt, able to dress without any issues.      Objective: See treatment diary below      Assessment: Tolerated treatment well. Patient demonstrated fatigue post treatment, exhibited good technique with therapeutic exercises, and would benefit from continued PT. HEP was updated and reviewed. No pain post tx voiced.      Plan: Continue per plan of care.  Progress treatment as tolerated.       Precautions: standard      Manuals 8/6 8/15           PROM L shld  SZ           Inf jt mobs  SZ                                     Neuro Re-Ed                                                                                                        Ther Ex             Pulleys  Flex/IR 10x15\" ea           Finger ladder if able  Np/HEP           Table slides flex, abd,ER  Np/HEP           UBE if able  4'/4'           Corner ER str  5x20\" ea           Supine B UE wand flex   10x2, 5\" ea           Supine B UE AB w TB  Green 10x2                                                               Pt ed HEP rev KT HEP update/review/given TB           Ther Activity                                       Gait Training                                       Modalities                                            "

## 2024-08-19 ENCOUNTER — OFFICE VISIT (OUTPATIENT)
Dept: PHYSICAL THERAPY | Facility: CLINIC | Age: 54
End: 2024-08-19
Payer: COMMERCIAL

## 2024-08-19 DIAGNOSIS — S46.912A STRAIN OF LEFT SHOULDER, INITIAL ENCOUNTER: Primary | ICD-10-CM

## 2024-08-19 PROCEDURE — 97110 THERAPEUTIC EXERCISES: CPT | Performed by: PHYSICAL THERAPIST

## 2024-08-19 NOTE — PROGRESS NOTES
"Daily Note     Today's date: 2024  Patient name: Fátima Barlow  : 1970  MRN: [id number]  Referring provider: Mary Evangelista MD  Dx:   Encounter Diagnosis     ICD-10-CM    1. Strain of left shoulder, initial encounter  S46.912A                      Subjective: Pt reports she's feeling well and thinks the exercises are helping. No longer having difficulty with any ADLs.       Objective: See treatment diary below  A/PROM left shoulder WNL flex, abd, IR, ER  Strength WNL except 4+/5 abduction    Assessment: Patient has normal A/PROM all planes and normal strength all planes except mildly decreased abduction. Updated HEP and instructed patient to follow up if needed over the next 30 days.       Plan:  Trial HEP only; leave episode open for 30 days     Precautions: standard      Manuals 8/6 8/15 8/19          PROM L shld  SZ normal          Inf jt mobs  SZ normal                                    Neuro Re-Ed                                                                                                        Ther Ex             Pulleys  Flex/IR 10x15\" ea 10\"x15 ea          Finger ladder if able  Np/HEP           Table slides flex, abd,ER  Np/HEP           UBE if able  4'/4'           Corner ER str  5x20\" ea rev          Supine B UE wand flex   10x2, 5\" ea           Supine B UE AB w TB  Green 10x2           SL abd   3# 30x          Posture tband   10x ea grn                                    Pt ed HEP rev KT HEP update/review/given TB HEP update KT          Ther Activity                                       Gait Training                                       Modalities                                              "

## 2024-08-20 ENCOUNTER — VBI (OUTPATIENT)
Dept: ADMINISTRATIVE | Facility: OTHER | Age: 54
End: 2024-08-20

## 2024-08-20 NOTE — TELEPHONE ENCOUNTER
08/20/24 10:39 AM     Chart reviewed for Pap Smear (HPV) aka Cervical Cancer Screening ; nothing is submitted to the patient's insurance at this time.     MARY DENG MA   [facility]

## 2024-08-23 ENCOUNTER — APPOINTMENT (OUTPATIENT)
Dept: PHYSICAL THERAPY | Facility: CLINIC | Age: 54
End: 2024-08-23
Payer: COMMERCIAL

## 2024-08-26 ENCOUNTER — APPOINTMENT (OUTPATIENT)
Dept: PHYSICAL THERAPY | Facility: CLINIC | Age: 54
End: 2024-08-26
Payer: COMMERCIAL

## 2024-08-30 ENCOUNTER — APPOINTMENT (OUTPATIENT)
Dept: PHYSICAL THERAPY | Facility: CLINIC | Age: 54
End: 2024-08-30
Payer: COMMERCIAL

## 2024-09-21 ENCOUNTER — HOSPITAL ENCOUNTER (OUTPATIENT)
Dept: MAMMOGRAPHY | Facility: CLINIC | Age: 54
Discharge: HOME | End: 2024-09-21
Payer: COMMERCIAL

## 2024-09-21 VITALS — BODY MASS INDEX: 39.44 KG/M2 | HEIGHT: 64 IN | WEIGHT: 231 LBS

## 2024-09-21 DIAGNOSIS — Z12.31 ENCOUNTER FOR SCREENING MAMMOGRAM FOR BREAST CANCER: ICD-10-CM

## 2024-09-21 PROCEDURE — 77063 BREAST TOMOSYNTHESIS BI: CPT

## 2024-09-21 PROCEDURE — 77067 SCR MAMMO BI INCL CAD: CPT

## 2024-10-21 ENCOUNTER — ANNUAL EXAM (OUTPATIENT)
Age: 54
End: 2024-10-21
Payer: COMMERCIAL

## 2024-10-21 VITALS
DIASTOLIC BLOOD PRESSURE: 84 MMHG | SYSTOLIC BLOOD PRESSURE: 122 MMHG | HEIGHT: 64 IN | WEIGHT: 229.4 LBS | BODY MASS INDEX: 39.16 KG/M2

## 2024-10-21 DIAGNOSIS — Z12.4 SCREENING FOR CERVICAL CANCER: ICD-10-CM

## 2024-10-21 DIAGNOSIS — Z11.51 SCREENING FOR HUMAN PAPILLOMAVIRUS (HPV): ICD-10-CM

## 2024-10-21 DIAGNOSIS — N95.0 POSTMENOPAUSAL BLEEDING: ICD-10-CM

## 2024-10-21 DIAGNOSIS — Z01.419 ENCOUNTER FOR ANNUAL ROUTINE GYNECOLOGICAL EXAMINATION: Primary | ICD-10-CM

## 2024-10-21 DIAGNOSIS — Z12.31 SCREENING MAMMOGRAM FOR BREAST CANCER: ICD-10-CM

## 2024-10-21 PROCEDURE — G0145 SCR C/V CYTO,THINLAYER,RESCR: HCPCS | Performed by: OBSTETRICS & GYNECOLOGY

## 2024-10-21 PROCEDURE — S0612 ANNUAL GYNECOLOGICAL EXAMINA: HCPCS | Performed by: OBSTETRICS & GYNECOLOGY

## 2024-10-21 PROCEDURE — G0476 HPV COMBO ASSAY CA SCREEN: HCPCS | Performed by: OBSTETRICS & GYNECOLOGY

## 2024-10-21 NOTE — PROGRESS NOTES
Fátmia Barlow   1970    CC:  Yearly exam    S:  53 y.o. female here for yearly exam.  LMP 8/30/2024 - thinks was stress induced, heavy and longer than expected.   Had been about a year prior.  Labs in 12/2023 were consistent with menopause.     She denies vaginal discharge, itching, odor or dryness.     Sexual activity: She is sexually active without pain, bleeding.  She does note dryness with intercourse, does not use lubricants.    Had vasectomy.     She does not report urinary incontinence, urinary concerns, bowel problems, breast concerns.     Last Pap: 12/20/2019 - NILM, Neg HPV  Last Mammo: 9/21/24 - BIRad 1  Last Colonoscopy: 2/2019 - 10yr recall     We reviewed ASCCP guidelines for Pap testing.     Family hx of breast cancer: M Aunt  Family hx of ovarian cancer: no  Family hx of colon cancer: PGM  Brother - testicular cancer (27)      Current Outpatient Medications:     albuterol (PROVENTIL HFA,VENTOLIN HFA) 90 mcg/act inhaler, TAKE 2 PUFFS BY MOUTH EVERY 6 HOURS AS NEEDED FOR WHEEZE OR FOR SHORTNESS OF BREATH, Disp: 8.5 g, Rfl: 3    cetirizine (ZyrTEC) 10 mg tablet, Take 10 mg by mouth daily, Disp: , Rfl:     fluticasone (FLONASE) 50 mcg/act nasal spray, 2 sprays into each nostril daily, Disp: 3 Bottle, Rfl: 1    Low-Ogestrel 0.3-30 MG-MCG per tablet, TAKE 1 TABLET BY MOUTH EVERY DAY (Patient not taking: Reported on 7/19/2024), Disp: 84 tablet, Rfl: 0    naproxen (Naprosyn) 500 mg tablet, Take 1 tablet (500 mg total) by mouth 2 (two) times a day with meals for 7 days (Patient not taking: Reported on 10/21/2024), Disp: 14 tablet, Rfl: 0  Patient Active Problem List   Diagnosis    Change in bowel movement     Family History   Problem Relation Age of Onset    No Known Problems Mother     Hypertension Father     COPD Father     Cancer Brother     No Known Problems Daughter     Diabetes Maternal Grandmother     Colon cancer Paternal Grandmother     Breast cancer Maternal Aunt 62    No Known Problems  "Paternal Aunt     Substance Abuse Neg Hx     Mental illness Neg Hx      History reviewed. No pertinent past medical history.     Review of Systems   Respiratory: Negative.    Cardiovascular: Negative.    Gastrointestinal: Negative for constipation and diarrhea.   Genitourinary: Negative for difficulty urinating, pelvic pain, vaginal bleeding, vaginal discharge, itching or odor.    O:  Blood pressure 122/84, height 5' 4\" (1.626 m), weight 104 kg (229 lb 6.4 oz), last menstrual period 08/30/2024.    Patient appears well and is not in distress  Breasts are symmetrical without mass, tenderness, nipple discharge, skin changes or adenopathy.   Abdomen is soft and nontender without masses.   External genitals are normal without lesions or rashes.  Urethral meatus and urethra are normal  Bladder is normal to palpation  Vagina is normal without discharge or bleeding, generalized atrophic changes present   Cervix is normal without discharge or lesion.   Uterus is normal, mobile, nontender without palpable mass.  Adnexa are normal, nontender, without palpable mass.     A:  Yearly exam, Possible postmenopausal bleeding.     P:   Pap & HPV today  Mammo up to date, ordered   Colon Cancer Screening up to date   PMB:  Will check pelvic US, to call if more bleeding.      Reviewed considerations of menopause, to call with any postmenopausal bleeding or other concerns.      RTO one year for yearly exam or sooner as needed.      "

## 2024-10-25 LAB
LAB AP GYN PRIMARY INTERPRETATION: NORMAL
Lab: NORMAL

## 2024-11-06 ENCOUNTER — TELEPHONE (OUTPATIENT)
Age: 54
End: 2024-11-06

## 2024-11-16 ENCOUNTER — HOSPITAL ENCOUNTER (OUTPATIENT)
Dept: RADIOLOGY | Facility: HOSPITAL | Age: 54
Discharge: HOME | End: 2024-11-16
Attending: OBSTETRICS & GYNECOLOGY
Payer: COMMERCIAL

## 2024-11-16 DIAGNOSIS — N95.0 POSTMENOPAUSAL BLEEDING: ICD-10-CM

## 2024-11-16 PROCEDURE — 76856 US EXAM PELVIC COMPLETE: CPT

## 2024-11-16 PROCEDURE — 76830 TRANSVAGINAL US NON-OB: CPT

## 2024-11-27 ENCOUNTER — RESULTS FOLLOW-UP (OUTPATIENT)
Age: 54
End: 2024-11-27

## 2024-11-27 NOTE — TELEPHONE ENCOUNTER
----- Message from Lilly Dennis MD sent at 11/27/2024  9:24 AM EST -----  Pelvic ultrasound reviewed.   Would recommend RTO for endometrial biopsy - endometrium was not fully clear on imaging - so would rather be safe than sorry.   Otherwise no concerns on pelvic ultrasound!

## 2024-12-20 ENCOUNTER — OFFICE VISIT (OUTPATIENT)
Dept: FAMILY MEDICINE CLINIC | Facility: CLINIC | Age: 54
End: 2024-12-20
Payer: COMMERCIAL

## 2024-12-20 VITALS
HEIGHT: 64 IN | TEMPERATURE: 97.3 F | HEART RATE: 90 BPM | RESPIRATION RATE: 18 BRPM | BODY MASS INDEX: 39.16 KG/M2 | SYSTOLIC BLOOD PRESSURE: 122 MMHG | OXYGEN SATURATION: 98 % | WEIGHT: 229.4 LBS | DIASTOLIC BLOOD PRESSURE: 82 MMHG

## 2024-12-20 DIAGNOSIS — R05.1 ACUTE COUGH: ICD-10-CM

## 2024-12-20 DIAGNOSIS — J01.90 ACUTE NON-RECURRENT SINUSITIS, UNSPECIFIED LOCATION: Primary | ICD-10-CM

## 2024-12-20 DIAGNOSIS — J02.9 SORE THROAT: ICD-10-CM

## 2024-12-20 LAB
S PYO AG THROAT QL: NEGATIVE
SARS-COV-2 AG UPPER RESP QL IA: NEGATIVE
VALID CONTROL: NORMAL

## 2024-12-20 PROCEDURE — 99213 OFFICE O/P EST LOW 20 MIN: CPT | Performed by: STUDENT IN AN ORGANIZED HEALTH CARE EDUCATION/TRAINING PROGRAM

## 2024-12-20 PROCEDURE — 87811 SARS-COV-2 COVID19 W/OPTIC: CPT | Performed by: STUDENT IN AN ORGANIZED HEALTH CARE EDUCATION/TRAINING PROGRAM

## 2024-12-20 PROCEDURE — 87880 STREP A ASSAY W/OPTIC: CPT | Performed by: STUDENT IN AN ORGANIZED HEALTH CARE EDUCATION/TRAINING PROGRAM

## 2024-12-20 NOTE — PROGRESS NOTES
"Name: Fátima Barlow      : 1970      MRN: [id number]  Encounter Provider: Mary Evangelista MD  Encounter Date: 2024   Encounter department: [facility]  :  Assessment & Plan  Acute cough    Orders:    POCT Rapid Covid Ag    Acute non-recurrent sinusitis, unspecified location  Rapid strep and COVID in office are negative, will start patient on Augmentin to be taken as directed for 7 days for sinus infection  Patient to continue antihistamine and Flonase    Follow-up as needed if symptoms worsen or do not improve  Orders:    amoxicillin-clavulanate (AUGMENTIN) 875-125 mg per tablet; Take 1 tablet by mouth every 12 (twelve) hours for 7 days    Sore throat    Orders:    POCT rapid ANTIGEN strepA         History of Present Illness     Fátima is a 54-year-old female who presents to the office today for sick visit.  Patient reports for the past week she has had sore throat congestion runny nose and significant sinus pressure.  Patient denies any fever.  However does have sick contacts at home.  Has not taken anything for this.      Review of Systems   Constitutional:  Negative for chills and fever.   HENT:  Positive for sinus pressure and sinus pain.    Respiratory:  Negative for shortness of breath.    Cardiovascular:  Negative for chest pain.       Objective   /82 (Patient Position: Sitting, Cuff Size: Standard)   Pulse 90   Temp (!) 97.3 °F (36.3 °C) (Temporal)   Resp 18   Ht 5' 4\" (1.626 m)   Wt 104 kg (229 lb 6.4 oz)   LMP 2024 (Exact Date)   SpO2 98%   BMI 39.38 kg/m²      Physical Exam  HENT:      Right Ear: Tympanic membrane and ear canal normal.      Left Ear: Tympanic membrane and ear canal normal.      Nose:      Right Sinus: Maxillary sinus tenderness and frontal sinus tenderness present.      Left Sinus: Maxillary sinus tenderness and frontal sinus tenderness present.   Eyes:      Extraocular Movements: Extraocular movements " intact.   Cardiovascular:      Rate and Rhythm: Normal rate.      Pulses: Normal pulses.   Pulmonary:      Effort: Pulmonary effort is normal. No respiratory distress.      Breath sounds: No wheezing.   Abdominal:      Palpations: Abdomen is soft.   Neurological:      Mental Status: She is alert.       Administrative Statements   I have spent a total time of 30 minutes in caring for this patient on the day of the visit/encounter including Risks and benefits of tx options, Patient and family education, Importance of tx compliance, Risk factor reductions, Documenting in the medical record, and Obtaining or reviewing history  .

## 2025-01-23 ENCOUNTER — PROCEDURE VISIT (OUTPATIENT)
Age: 55
End: 2025-01-23
Payer: COMMERCIAL

## 2025-01-23 DIAGNOSIS — N95.0 POSTMENOPAUSAL BLEEDING: Primary | ICD-10-CM

## 2025-01-23 PROCEDURE — 58100 BIOPSY OF UTERUS LINING: CPT | Performed by: OBSTETRICS & GYNECOLOGY

## 2025-01-23 PROCEDURE — 88305 TISSUE EXAM BY PATHOLOGIST: CPT | Performed by: STUDENT IN AN ORGANIZED HEALTH CARE EDUCATION/TRAINING PROGRAM

## 2025-01-23 NOTE — PROGRESS NOTES
Endometrial biopsy    Date/Time: 1/23/2025 2:00 PM    Performed by: Lilly Dennis MD  Authorized by: Lilly Dennis MD  Universal Protocol:  Consent: Verbal consent obtained.  Risks and benefits: risks, benefits and alternatives were discussed  Consent given by: patient  Patient understanding: patient states understanding of the procedure being performed  Patient consent: the patient's understanding of the procedure matches consent given  Patient identity confirmed: verbally with patient    Indication:     Indications: Post-menopausal bleeding    Procedure:     Procedure: endometrial biopsy with Pipelle      A bivalve speculum was placed in the vagina: yes      Cervix cleaned and prepped: yes      The cervix was dilated: yes      Uterus sounded: yes      Uterus sound depth (cm):  7    Specimen collected: specimen collected and sent to pathology      Patient tolerated procedure well with no complications: yes    Findings:     Uterus size:  Non-gravid    Cervix: normal      Adnexa: normal

## 2025-01-28 PROCEDURE — 88305 TISSUE EXAM BY PATHOLOGIST: CPT | Performed by: STUDENT IN AN ORGANIZED HEALTH CARE EDUCATION/TRAINING PROGRAM

## 2025-02-07 ENCOUNTER — RESULTS FOLLOW-UP (OUTPATIENT)
Age: 55
End: 2025-02-07

## 2025-02-17 ENCOUNTER — OFFICE VISIT (OUTPATIENT)
Dept: FAMILY MEDICINE CLINIC | Facility: CLINIC | Age: 55
End: 2025-02-17
Payer: COMMERCIAL

## 2025-02-17 VITALS
OXYGEN SATURATION: 98 % | RESPIRATION RATE: 15 BRPM | HEIGHT: 64 IN | TEMPERATURE: 97.9 F | WEIGHT: 231.8 LBS | BODY MASS INDEX: 39.57 KG/M2 | DIASTOLIC BLOOD PRESSURE: 84 MMHG | HEART RATE: 82 BPM | SYSTOLIC BLOOD PRESSURE: 142 MMHG

## 2025-02-17 DIAGNOSIS — B34.9 VIRAL ILLNESS: ICD-10-CM

## 2025-02-17 DIAGNOSIS — J20.9 ACUTE BRONCHITIS, UNSPECIFIED ORGANISM: Primary | ICD-10-CM

## 2025-02-17 LAB
SARS-COV-2 AG UPPER RESP QL IA: NEGATIVE
SL AMB POCT RAPID FLU A: NEGATIVE
SL AMB POCT RAPID FLU B: NEGATIVE
VALID CONTROL: NORMAL

## 2025-02-17 PROCEDURE — 87804 INFLUENZA ASSAY W/OPTIC: CPT | Performed by: FAMILY MEDICINE

## 2025-02-17 PROCEDURE — 99213 OFFICE O/P EST LOW 20 MIN: CPT | Performed by: FAMILY MEDICINE

## 2025-02-17 PROCEDURE — 87811 SARS-COV-2 COVID19 W/OPTIC: CPT | Performed by: FAMILY MEDICINE

## 2025-02-17 RX ORDER — CEFUROXIME AXETIL 500 MG/1
TABLET ORAL
Qty: 14 TABLET | Refills: 0 | Status: SHIPPED | OUTPATIENT
Start: 2025-02-17 | End: 2025-02-24

## 2025-02-17 NOTE — PROGRESS NOTES
"Name: Fátima Barlow      : 1970      MRN: [id number]  Encounter Provider: Coby Monroe DO  Encounter Date: 2025   Encounter department: [facility]  :  Assessment & Plan  Viral illness    Orders:    POCT rapid flu A and B    POCT Rapid Covid Ag    Acute bronchitis, unspecified organism  Most likely RSV but now turning into a secondary infection send she improved but now is becoming worse  Since it is postviral we will treat her with Ceftin twice a day for 10 days  She is not improving in 48 hours or if she gets worse she will let us know  Otherwise she knows that the cough might be annoying but can last up to 6 weeks or more  Orders:    cefuroxime (CEFTIN) 500 mg tablet; A 1 twice daily until the bottle is empty           History of Present Illness   A week ago patient started with chills and fevers basically for the 1st 3 days of last week slept.  Started with a sore throat and a cough and then it all seem to go into her chest.  She feels better today but she feels a going down into her lungs  She noticed mucus that was green which is new and a rattle in her chest      Review of Systems  As per HPI  Objective   /84   Pulse 82   Temp 97.9 °F (36.6 °C)   Resp 15   Ht 5' 4\" (1.626 m)   Wt 105 kg (231 lb 12.8 oz)   LMP 2024 (Exact Date)   SpO2 98%   BMI 39.79 kg/m²      Physical Exam  Constitutional  Appears healthy, Looks well, Appearance consistent with age    Mental Status  Alert, Oriented, Cooperative, Memory function normal , clean, and reasonable    Neck  No neck mass, No thyromegaly, Good carotid upstrokes bilaterally, trachea midline positive click    Respiratory  Scattered rhonchi heard in right upper lung posteriorly louder than anteriorly.  No wheezing no rales no tactile fremitus    Cardiac  Regular rhythm without ectopy or murmur no S3-S4, no heave lift or thrill to palpation    Vascular  No leg edema, No pedal " edema    Muscular skeletal  No clubbing cyanosis , muscle tone normal    Skin  No appreciable rashes or abnormal appearing lesions

## 2025-02-17 NOTE — PROGRESS NOTES
Assessment/Plan:               Subjective:   Fátima Barlow is a 54 y.o.female  Chief Complaint   Patient presents with   • Cough   • Weakness - Generalized   • Nasal Drainage     HPI    Past medical history, social history, and family history reviewed as appropriate for the complaint of this patient.      MEDICATIONS REVIEWED AND UPDATED    10 point review of systems performed, the remainder of the ROS is negative except for what is noted in the history of chief complaint    Objective:    Vitals:    02/17/25 0805   BP: 142/84   Pulse: 82   Resp: 15   Temp: 97.9 °F (36.6 °C)   SpO2: 98%     Body mass index is 39.79 kg/m².    Physical Exam

## 2025-02-17 NOTE — PATIENT INSTRUCTIONS
Begin antibiotic twice a day and take it until the bottle is empty    Not improving in 48 hours or if you become worse please let us now    Increase fluids and rest when you are tired because at site he will    Continue cough medicine at nighttime

## 2025-02-18 ENCOUNTER — TELEPHONE (OUTPATIENT)
Age: 55
End: 2025-02-18

## 2025-02-18 NOTE — TELEPHONE ENCOUNTER
LMOM (ok per comm consent)  Biopsy benign and Dr. Dennis will just monitor and to call the office with any further bleeding or issues. Provided call back number for any questions

## 2025-07-18 ENCOUNTER — RA CDI HCC (OUTPATIENT)
Dept: OTHER | Facility: HOSPITAL | Age: 55
End: 2025-07-18

## 2025-07-25 ENCOUNTER — OFFICE VISIT (OUTPATIENT)
Dept: FAMILY MEDICINE CLINIC | Facility: CLINIC | Age: 55
End: 2025-07-25
Payer: COMMERCIAL

## 2025-07-25 VITALS
DIASTOLIC BLOOD PRESSURE: 86 MMHG | HEART RATE: 94 BPM | TEMPERATURE: 97.4 F | RESPIRATION RATE: 15 BRPM | OXYGEN SATURATION: 97 % | HEIGHT: 64 IN | WEIGHT: 234 LBS | SYSTOLIC BLOOD PRESSURE: 124 MMHG | BODY MASS INDEX: 39.95 KG/M2

## 2025-07-25 DIAGNOSIS — Z23 ENCOUNTER FOR IMMUNIZATION: ICD-10-CM

## 2025-07-25 DIAGNOSIS — Z00.00 ANNUAL PHYSICAL EXAM: Primary | ICD-10-CM

## 2025-07-25 DIAGNOSIS — Z13.228 SCREENING FOR METABOLIC DISORDER: ICD-10-CM

## 2025-07-25 DIAGNOSIS — Z13.220 SCREENING FOR CHOLESTEROL LEVEL: ICD-10-CM

## 2025-07-25 DIAGNOSIS — E55.9 VITAMIN D DEFICIENCY: ICD-10-CM

## 2025-07-25 DIAGNOSIS — Z13.1 SCREENING FOR DIABETES MELLITUS: ICD-10-CM

## 2025-07-25 DIAGNOSIS — Z13.0 SCREENING FOR BLOOD DISEASE: ICD-10-CM

## 2025-07-25 DIAGNOSIS — Z13.89 SCREENING FOR BLOOD OR PROTEIN IN URINE: ICD-10-CM

## 2025-07-25 DIAGNOSIS — R06.81 WITNESSED EPISODE OF APNEA: ICD-10-CM

## 2025-07-25 DIAGNOSIS — Z13.29 SCREENING FOR THYROID DISORDER: ICD-10-CM

## 2025-07-25 DIAGNOSIS — G47.19 EXCESSIVE DAYTIME SLEEPINESS: ICD-10-CM

## 2025-07-25 PROCEDURE — 99396 PREV VISIT EST AGE 40-64: CPT | Performed by: FAMILY MEDICINE

## 2025-07-25 PROCEDURE — 90715 TDAP VACCINE 7 YRS/> IM: CPT | Performed by: FAMILY MEDICINE

## 2025-07-25 PROCEDURE — 90471 IMMUNIZATION ADMIN: CPT | Performed by: FAMILY MEDICINE

## 2025-07-25 NOTE — PROGRESS NOTES
Adult Annual Physical  Name: Fátima Barlow      : 1970      MRN: [id number]  Encounter Provider: Julita Flannery MD  Encounter Date: 2025   Encounter department: [facility]    :  Assessment & Plan  Annual physical exam  - screening labs ordered       - Lipid Panel with Direct LDL reflex       - CBC and differential       - Comprehensive metabolic panel       - UA (URINE) with reflex to Scope       - TSH, 3rd generation with Free T4 reflex       - Vitamin D 25 hydroxy       Encounter for immunization  - Tdap due today    Orders:    TDAP VACCINE GREATER THAN OR EQUAL TO 6YO IM    Excessive daytime sleepiness  - will refer to Sleep Medicine for further evaluation    Orders:    Ambulatory Referral to Sleep Medicine; Future    Witnessed episode of apnea    Orders:    Ambulatory Referral to Sleep Medicine; Future      Preventive Screenings:  - Diabetes Screening: orders placed  - Cholesterol Screening: orders placed   - Hepatitis C screening: screening up-to-date   - Cervical cancer screening: screening up-to-date   - Breast cancer screening: screening up-to-date   - Colon cancer screening: screening up-to-date   - Lung cancer screening: screening not indicated     Immunizations:  - Immunizations due: Prevnar 20 and Zoster (Shingrix)    Counseling/Anticipatory Guidance:    - Dental health: discussed importance of regular tooth brushing, flossing, and dental visits.   - Diet: discussed recommendations for a healthy/well-balanced diet.   - Exercise: the importance of regular exercise/physical activity was discussed. Recommend exercise 3-5 times per week for at least 30 minutes.   - Injury prevention: discussed safety/seat belts, safety helmets, smoke detectors, carbon monoxide detectors, and smoking near bedding or upholstery.          History of Present Illness     Adult Annual Physical:  Patient presents for annual physical.     Diet and Physical Activity:  -  Diet/Nutrition: well balanced diet. Trying to lose weight, not successful  - Exercise: walking and 1-2 times a week on average. trying to incorporate 30-60 minutes of walking several times per week    Depression Screening:  - PHQ-2 Score: 0    General Health:  - Sleep: 7-8 hours of sleep on average, snores loudly, unrefreshing sleep and witnessed apnea.  says that I have exhibited signs of sleep apnea.  - Hearing: normal hearing bilateral ears.  - Vision: wears glasses and contacts.  - Dental: regular dental visits, brushes teeth twice daily and floss regularly.    /GYN Health:  - Follows with GYN: yes.   - Menopause: postmenopausal.   - Last menstrual cycle: 10/1/2024.   - History of STDs: no  - Contraception: male partner had vasectomy.      Advanced Care Planning:  - Has an advanced directive?: no    - Has a durable medical POA?: no      Review of Systems   Constitutional:  Negative for appetite change, chills, fatigue, fever and unexpected weight change.   HENT:  Negative for congestion, ear pain, rhinorrhea and sore throat.    Eyes:  Negative for pain, discharge, redness, itching and visual disturbance.   Respiratory:  Negative for cough, shortness of breath and wheezing.    Cardiovascular:  Negative for chest pain, palpitations and leg swelling.   Gastrointestinal:  Negative for abdominal pain, blood in stool, constipation, diarrhea, nausea and vomiting.   Endocrine: Negative for cold intolerance, heat intolerance, polydipsia and polyuria.   Genitourinary:  Negative for dysuria, frequency, hematuria, pelvic pain, urgency and vaginal discharge.   Musculoskeletal:  Negative for back pain, gait problem, joint swelling and myalgias.   Skin:  Negative for rash.   Neurological:  Negative for dizziness, syncope, weakness, numbness and headaches.   Hematological:  Negative for adenopathy.   Psychiatric/Behavioral:  Negative for dysphoric mood and sleep disturbance. The patient is not nervous/anxious.   "        Objective   /86   Pulse 94   Temp (!) 97.4 °F (36.3 °C)   Resp 15   Ht 5' 3.75\" (1.619 m)   Wt 106 kg (234 lb)   LMP 10/01/2024   SpO2 97%   BMI 40.48 kg/m²     Physical Exam  Constitutional:       General: She is not in acute distress.     Appearance: She is well-developed.   HENT:      Head: Normocephalic and atraumatic.      Right Ear: Tympanic membrane, ear canal and external ear normal.      Left Ear: Tympanic membrane, ear canal and external ear normal.      Mouth/Throat:      Mouth: Mucous membranes are moist.      Pharynx: Oropharynx is clear.     Eyes:      General: No scleral icterus.     Extraocular Movements: Extraocular movements intact.      Conjunctiva/sclera: Conjunctivae normal.      Pupils: Pupils are equal, round, and reactive to light.     Neck:      Thyroid: No thyromegaly.      Vascular: No JVD.     Cardiovascular:      Rate and Rhythm: Normal rate and regular rhythm.      Heart sounds: Normal heart sounds. No murmur heard.  Pulmonary:      Effort: Pulmonary effort is normal. No respiratory distress.      Breath sounds: Normal breath sounds. No wheezing, rhonchi or rales.   Abdominal:      General: There is no distension.      Palpations: Abdomen is soft. There is no mass.      Tenderness: There is no abdominal tenderness.     Musculoskeletal:      Cervical back: Neck supple.      Right lower leg: No edema.      Left lower leg: No edema.   Lymphadenopathy:      Cervical: No cervical adenopathy.     Skin:     Findings: No rash.     Neurological:      General: No focal deficit present.      Mental Status: She is alert and oriented to person, place, and time.      Cranial Nerves: No cranial nerve deficit.     Psychiatric:         Mood and Affect: Mood normal.         Behavior: Behavior normal.         Thought Content: Thought content normal.         Judgment: Judgment normal.         "